# Patient Record
Sex: FEMALE | Race: WHITE | NOT HISPANIC OR LATINO | Employment: FULL TIME | URBAN - METROPOLITAN AREA
[De-identification: names, ages, dates, MRNs, and addresses within clinical notes are randomized per-mention and may not be internally consistent; named-entity substitution may affect disease eponyms.]

---

## 2023-02-18 PROBLEM — R20.2 PARESTHESIA OF BOTH FEET: Status: ACTIVE | Noted: 2023-02-18

## 2023-02-18 PROBLEM — M67.01 SHORT ACHILLES TENDON OF RIGHT LOWER EXTREMITY: Status: ACTIVE | Noted: 2023-02-18

## 2023-02-18 PROBLEM — E11.9 TYPE 2 DIABETES MELLITUS (MULTI): Status: ACTIVE | Noted: 2023-02-18

## 2023-02-18 PROBLEM — E66.01 SEVERE OBESITY (BMI >= 40) (MULTI): Status: ACTIVE | Noted: 2023-02-18

## 2023-02-18 PROBLEM — M21.6X1 PRONATION OF BOTH FEET: Status: ACTIVE | Noted: 2023-02-18

## 2023-02-18 PROBLEM — N91.5 OLIGOMENORRHEA: Status: ACTIVE | Noted: 2023-02-18

## 2023-02-18 PROBLEM — N97.9 FEMALE INFERTILITY: Status: ACTIVE | Noted: 2023-02-18

## 2023-02-18 PROBLEM — F41.9 ANXIETY: Status: ACTIVE | Noted: 2023-02-18

## 2023-02-18 PROBLEM — E11.9 DIABETES MELLITUS (MULTI): Status: ACTIVE | Noted: 2023-02-18

## 2023-02-18 PROBLEM — R63.2 POLYPHAGIA: Status: ACTIVE | Noted: 2023-02-18

## 2023-02-18 PROBLEM — M67.02 SHORT ACHILLES TENDON OF LEFT LOWER EXTREMITY: Status: ACTIVE | Noted: 2023-02-18

## 2023-02-18 PROBLEM — I10 HYPERTENSION: Status: ACTIVE | Noted: 2023-02-18

## 2023-02-18 PROBLEM — M21.6X2 PRONATION OF BOTH FEET: Status: ACTIVE | Noted: 2023-02-18

## 2023-02-18 PROBLEM — M72.2 PLANTAR FASCIITIS: Status: ACTIVE | Noted: 2023-02-18

## 2023-02-18 PROBLEM — J30.9 ALLERGIC RHINITIS: Status: ACTIVE | Noted: 2023-02-18

## 2023-02-18 PROBLEM — I10 CHRONIC HYPERTENSION: Status: ACTIVE | Noted: 2023-02-18

## 2023-02-18 PROBLEM — M67.00 ACQUIRED SHORT ACHILLES TENDON: Status: ACTIVE | Noted: 2023-02-18

## 2023-02-18 PROBLEM — G47.33 OBSTRUCTIVE SLEEP APNEA: Status: ACTIVE | Noted: 2023-02-18

## 2023-02-18 PROBLEM — E66.01 OBESITY, MORBID (MORE THAN 100 LBS OVER IDEAL WEIGHT OR BMI > 40) (MULTI): Status: ACTIVE | Noted: 2023-02-18

## 2023-02-18 RX ORDER — TIRZEPATIDE 5 MG/.5ML
5 INJECTION, SOLUTION SUBCUTANEOUS
COMMUNITY
End: 2023-03-07

## 2023-02-18 RX ORDER — METFORMIN HYDROCHLORIDE 500 MG/1
1 TABLET ORAL DAILY
COMMUNITY
Start: 2019-10-30 | End: 2023-03-07 | Stop reason: SDUPTHER

## 2023-02-18 RX ORDER — LANCETS 33 GAUGE
EACH MISCELLANEOUS
COMMUNITY
End: 2023-03-07

## 2023-02-18 RX ORDER — METFORMIN HYDROCHLORIDE 750 MG/1
2 TABLET, EXTENDED RELEASE ORAL
COMMUNITY
Start: 2019-10-30 | End: 2023-03-07 | Stop reason: SDUPTHER

## 2023-02-18 RX ORDER — LABETALOL 100 MG/1
1 TABLET, FILM COATED ORAL EVERY 12 HOURS
COMMUNITY
End: 2023-03-07 | Stop reason: SDUPTHER

## 2023-02-18 RX ORDER — BLOOD-GLUCOSE METER
EACH MISCELLANEOUS
COMMUNITY
End: 2023-03-07 | Stop reason: ALTCHOICE

## 2023-02-18 RX ORDER — AZELASTINE 1 MG/ML
2 SPRAY, METERED NASAL 2 TIMES DAILY
COMMUNITY
Start: 2022-07-22 | End: 2023-09-14 | Stop reason: WASHOUT

## 2023-03-06 NOTE — PROGRESS NOTES
Subjective   Arti Macias is a 32 y.o. female who presents for Hypertension, Diabetes, and Follow-up.  HPI    PMHx:  - Allergic rhinitis - on zyrtec, no response to floanse.   - DM2 with neuropathy A1C 8.4% diagnosed at 16, type 2. Takes metformin 750mg ER twice a day and metformin 500mg at night followed by endocrinology Vickie Phelps who started her on Mounjaro in November but was concerned about potential increase in risk for thyroid cancer (cousin with papillary thyroid cancer). She has hired a  and has lost inches in her weights. She has adjusted her diet regimen and has been trying to make better choices in general. She has found no significant benefit to nutrition referral.   - Planter fasciitis previously recommended PT. has tried stretching exercises   - Hypertension on labetolol improved at last visit interested in becoming pregnant  - Gallstones for the past 10 years. not an issue for her last flare over 20 years ago.  - JACKIE - chronic - previously on several medications discontinued in adulthood, provided [facility] resources at last visit.  - Sleep disorder referred to sleep medicine   - Lower extremity edema thought secondary to venous insufficiency   - Class III obesity - not interested in medical management, previously on phentermine which worked (but restriction in [place] scheduling), the first time she took it lost 30 pounds in 3 months, second time was not successful because did not do lifestyle modifications along with it. She did note heart racing on this medication and notices an energy boost on this medication. When originally lost the weight was 295 pounds and A1C reduced to 6.6%      Social:   - Lives at home with ,  at [facility]   - no children.     Objective     /84   Pulse 88   Temp 36.7 °C (98.1 °F)   Wt (!) 142 kg (314 lb)   BMI 52.25 kg/m²      Exam:   General: Alert and oriented, in no apparent distress   HEENT: No conjunctival erythema, no  external facial lesions   Lungs: Breathing comfortably   Skin: No evidence of skin breakdown   Neuro: AAO x 3, answering questions appropriately, no obvious cranial nerve deficits.    Problem List Items Addressed This Visit    Assessment and Plan      Nervous    Type 2 diabetes mellitus with diabetic neuropathy, without long-term current use of insulin (CMS/[facility])     Last A1C 8.4%, interested in rechecking with endocrinology, maintained on metformin 750mg x 2 + metformin 500mg at bedtime.   Started mounjaro by endocrinology though she is extremely hesitant regarding initiation of GLP-1 agonists due to concern regarding theoretical increase risk in thyroid cancer. There is no known diagnosis of MEN syndrome or medullary thyroid carcinoma in her family.   She does have known history of gallstones that she is not interested in managing surgically at present and this may be a reason to hold off on GLP-1 agonists.   Continues with lifestyle modifications, interested in further addressing with endocrinology.   UTD with optho, neuropathy stable, not yet on statin, screen for nephropathy.          Relevant Medications    metFORMIN XR (Glucophage-XR) 750 mg 24 hr tablet    metFORMIN (Glucophage) 500 mg tablet       Circulatory    Hypertension - Primary     Maintained on Labetolol, currently controlled, was interested in becoming pregnant but precontemplative regarding alternate treatment method.   Does not wish to restart an ACEi, discussed consideration for ARB to avoid the dry cough as a possible side effect.          Relevant Medications    labetalol (Normodyne) 100 mg tablet       Digestive    Gallstones     Triggered by certain foods, not interested in surgical management at present.            Endocrine/Metabolic    Severe obesity (BMI >= 40) (CMS/[facility])     Extensively discussed, not currently interested in GLP-1 agonist, though has had some success with phentermine even when prescribed on a limited basis.   Discussed  concern regarding risk for weight gain after period of discontinuation and its potential long term consequences including evidence of reduced basal metabolic rate which does not recover if weight were to be regained. For now, elected to pursue continued lifestyle interventions and hold off on any medical management of her weight at present.   Has seen nutritionist but not interested in following up further, follows with .   Further encouragement and counseling provided, will continue to monitor.                Time Spent  Prep time on day of patient encounter: 5 minutes

## 2023-03-07 ENCOUNTER — OFFICE VISIT (OUTPATIENT)
Dept: PRIMARY CARE | Facility: CLINIC | Age: 33
End: 2023-03-07
Payer: COMMERCIAL

## 2023-03-07 VITALS
DIASTOLIC BLOOD PRESSURE: 84 MMHG | SYSTOLIC BLOOD PRESSURE: 136 MMHG | HEART RATE: 88 BPM | BODY MASS INDEX: 52.25 KG/M2 | TEMPERATURE: 98.1 F | WEIGHT: 293 LBS

## 2023-03-07 DIAGNOSIS — I10 PRIMARY HYPERTENSION: Primary | ICD-10-CM

## 2023-03-07 DIAGNOSIS — E11.40 TYPE 2 DIABETES MELLITUS WITH DIABETIC NEUROPATHY, WITHOUT LONG-TERM CURRENT USE OF INSULIN (MULTI): Chronic | ICD-10-CM

## 2023-03-07 DIAGNOSIS — Z00.00 ENCOUNTER FOR PREVENTATIVE ADULT HEALTH CARE EXAMINATION: ICD-10-CM

## 2023-03-07 DIAGNOSIS — K80.20 GALLSTONES: ICD-10-CM

## 2023-03-07 DIAGNOSIS — E66.01 SEVERE OBESITY (BMI >= 40) (MULTI): ICD-10-CM

## 2023-03-07 PROBLEM — E11.9 DIABETES MELLITUS (MULTI): Status: RESOLVED | Noted: 2023-02-18 | Resolved: 2023-03-07

## 2023-03-07 PROBLEM — M67.02 SHORT ACHILLES TENDON OF LEFT LOWER EXTREMITY: Status: RESOLVED | Noted: 2023-02-18 | Resolved: 2023-03-07

## 2023-03-07 PROBLEM — E11.9 TYPE 2 DIABETES MELLITUS (MULTI): Status: RESOLVED | Noted: 2023-02-18 | Resolved: 2023-03-07

## 2023-03-07 PROBLEM — M72.2 PLANTAR FASCIITIS: Status: RESOLVED | Noted: 2023-02-18 | Resolved: 2023-03-07

## 2023-03-07 PROBLEM — M67.01 SHORT ACHILLES TENDON OF RIGHT LOWER EXTREMITY: Status: RESOLVED | Noted: 2023-02-18 | Resolved: 2023-03-07

## 2023-03-07 PROCEDURE — 3079F DIAST BP 80-89 MM HG: CPT | Performed by: INTERNAL MEDICINE

## 2023-03-07 PROCEDURE — 99213 OFFICE O/P EST LOW 20 MIN: CPT | Performed by: INTERNAL MEDICINE

## 2023-03-07 PROCEDURE — 3075F SYST BP GE 130 - 139MM HG: CPT | Performed by: INTERNAL MEDICINE

## 2023-03-07 PROCEDURE — 1036F TOBACCO NON-USER: CPT | Performed by: INTERNAL MEDICINE

## 2023-03-07 RX ORDER — METFORMIN HYDROCHLORIDE 750 MG/1
1500 TABLET, EXTENDED RELEASE ORAL
Qty: 180 TABLET | Refills: 1 | Status: SHIPPED | OUTPATIENT
Start: 2023-03-07 | End: 2023-09-14 | Stop reason: WASHOUT

## 2023-03-07 RX ORDER — LABETALOL 100 MG/1
1 TABLET, FILM COATED ORAL EVERY 12 HOURS
Qty: 180 TABLET | Refills: 1 | Status: SHIPPED | OUTPATIENT
Start: 2023-03-07 | End: 2023-12-11 | Stop reason: SDUPTHER

## 2023-03-07 RX ORDER — METFORMIN HYDROCHLORIDE 500 MG/1
500 TABLET ORAL
Qty: 90 TABLET | Refills: 1 | Status: SHIPPED | OUTPATIENT
Start: 2023-03-07 | End: 2023-09-14 | Stop reason: WASHOUT

## 2023-03-07 NOTE — PATIENT INSTRUCTIONS
Let's schedule a physical exam in 6 months   Measure home blood pressure readings intermittently. Goal should be 120s/70s.   Diabetes and weight - consider Mounjaro as discussed, we can also further discuss phentermine.

## 2023-03-08 NOTE — ASSESSMENT & PLAN NOTE
Last A1C 8.4%, interested in rechecking with endocrinology, maintained on metformin 750mg x 2 + metformin 500mg at bedtime.   Started mounjaro by endocrinology though she is extremely hesitant regarding initiation of GLP-1 agonists due to concern regarding theoretical increase risk in thyroid cancer. There is no known diagnosis of MEN syndrome or medullary thyroid carcinoma in her family.   She does have known history of gallstones that she is not interested in managing surgically at present and this may be a reason to hold off on GLP-1 agonists.   Continues with lifestyle modifications, interested in further addressing with endocrinology.   UTD with optho, neuropathy stable, not yet on statin, screen for nephropathy.

## 2023-03-08 NOTE — ASSESSMENT & PLAN NOTE
Extensively discussed, not currently interested in GLP-1 agonist, though has had some success with phentermine even when prescribed on a limited basis.   Discussed concern regarding risk for weight gain after period of discontinuation and its potential long term consequences including evidence of reduced basal metabolic rate which does not recover if weight were to be regained. For now, elected to pursue continued lifestyle interventions and hold off on any medical management of her weight at present.   Has seen nutritionist but not interested in following up further, follows with .   Further encouragement and counseling provided, will continue to monitor.

## 2023-03-08 NOTE — ASSESSMENT & PLAN NOTE
Maintained on Labetolol, currently controlled, was interested in becoming pregnant but precontemplative regarding alternate treatment method.   Does not wish to restart an ACEi, discussed consideration for ARB to avoid the dry cough as a possible side effect.

## 2023-04-03 ENCOUNTER — APPOINTMENT (OUTPATIENT)
Dept: PRIMARY CARE | Facility: CLINIC | Age: 33
End: 2023-04-03
Payer: COMMERCIAL

## 2023-04-04 ENCOUNTER — TELEMEDICINE (OUTPATIENT)
Dept: PRIMARY CARE | Facility: CLINIC | Age: 33
End: 2023-04-04
Payer: COMMERCIAL

## 2023-04-04 DIAGNOSIS — E66.01 OBESITY, MORBID (MORE THAN 100 LBS OVER IDEAL WEIGHT OR BMI > 40) (MULTI): ICD-10-CM

## 2023-04-04 DIAGNOSIS — R10.9 LEFT FLANK PAIN: Primary | ICD-10-CM

## 2023-04-04 PROCEDURE — 99214 OFFICE O/P EST MOD 30 MIN: CPT | Performed by: INTERNAL MEDICINE

## 2023-04-04 NOTE — ASSESSMENT & PLAN NOTE
Patient presenting for left flank and side pain with worsening during exercise, relieved after prolonged period of rest.  There are no alarm features symptoms are reproducible to palpation, no urinary or GI complaints.  Differential includes musculoskeletal versus nephrolithiasis versus anterior cutaneous nerve entrapment syndrome.  For now, advised trial of stretching hydration heating pad and conservative pain control.  If symptoms fail to improve will obtain urine studies to assess for hematuria and consideration for Hemoccult CT to rule out nephrolithiasis.  Warning signs reviewed.

## 2023-04-04 NOTE — ASSESSMENT & PLAN NOTE
With good success after initiation half dose of phentermine with only mild side effects. Has adequate followup with Vickie Phelps.

## 2023-04-04 NOTE — PROGRESS NOTES
Subjective      HPI  32-year-old female here out of concern for back pain, last seen last month.     She has been experiencing left sided dull back pain that has been dull, now worse when working out. It was previously described as a cramping sensation worsens with exercise but improves when she stops working out for days. Her workout schedule has been a little off. This has never happened before in this region. No issues with urination, no GI symptoms. The symptoms come and and go, has good and bad days, describes as soreness, uncomfortable to sit for long periods of time, reproducible on palpation. She does sleep on that side as well. The pain does not radiate to the groin. Overall she is feeling good and is eating well. She admits to not drinking a lot of water, and has been urinating less since starting, though she did cut out coffee.     PMHx:  - DM2 with neuropathy A1C 8.4% diagnosed at 16, type 2. Takes metformin 750mg ER twice a day and metformin 500mg at night followed by endocrinology Vickie Phelps who started her on Phentermine recently (concerned regarding issues with mounjaro). She has hired a  and has lost inches in her weights. She has adjusted her diet regimen and has been trying to make better choices in general. She has found no significant benefit to nutrition referral. She has lost 30 pounds in 3 months when last on phentermine. Since starting phentermine, she has thus far lost over 11 pounds.  - Hypertension on labetolol improved at last visit interested in becoming pregnant discussed ARB at last visit   - Gallstones for the past 10 years. not an issue for her last flare over 20 years ago.  - JACKIE - chronic - previously on several medications discontinued in adulthood, provided [facility] resources at last visit.  - Sleep disorder referred to sleep medicine   - Lower extremity edema thought secondary to venous insufficiency   Social:   - Lives at home with ,  at  SNF   - no children.     Review of Systems  Review of systems as stated above, otherwise unremarkable.       Objective   Physical Activity: Not on file   General: Alert and oriented, in no apparent distress   HEENT: No conjunctival erythema, no external facial lesions   Lungs: Breathing comfortably  Skin: No evidence of skin breakdown.  Neuro: AAO x 3, answering questions appropriately, no obvious cranial nerve deficits  She is able to point to her midabdomen in the midaxillary line with reproducible tenderness to palpation, no apparent rebound with palpation no additional alarm symptoms.       Assessment/Plan   Problem List Items Addressed This Visit          Nervous    Left flank pain - Primary    Current Assessment & Plan     Patient presenting for left flank and side pain with worsening during exercise, relieved after prolonged period of rest.  There are no alarm features symptoms are reproducible to palpation, no urinary or GI complaints.  Differential includes musculoskeletal versus nephrolithiasis versus anterior cutaneous nerve entrapment syndrome.  For now, advised trial of stretching hydration heating pad and conservative pain control.  If symptoms fail to improve will obtain urine studies to assess for hematuria and consideration for Hemoccult CT to rule out nephrolithiasis.  Warning signs reviewed.

## 2023-04-13 ENCOUNTER — LAB (OUTPATIENT)
Dept: LAB | Facility: LAB | Age: 33
End: 2023-04-13
Payer: COMMERCIAL

## 2023-04-13 DIAGNOSIS — R10.9 LEFT FLANK PAIN: ICD-10-CM

## 2023-04-13 LAB
ALANINE AMINOTRANSFERASE (SGPT) (U/L) IN SER/PLAS: 18 U/L (ref 7–45)
ALBUMIN (G/DL) IN SER/PLAS: 4 G/DL (ref 3.4–5)
ALBUMIN (MG/L) IN URINE: 17.6 MG/L
ALBUMIN/CREATININE (UG/MG) IN URINE: 13 UG/MG CRT (ref 0–30)
ALKALINE PHOSPHATASE (U/L) IN SER/PLAS: 35 U/L (ref 33–110)
ANION GAP IN SER/PLAS: 16 MMOL/L (ref 10–20)
APPEARANCE, URINE: ABNORMAL
ASPARTATE AMINOTRANSFERASE (SGOT) (U/L) IN SER/PLAS: 17 U/L (ref 9–39)
BASOPHILS (10*3/UL) IN BLOOD BY AUTOMATED COUNT: 0.05 X10E9/L (ref 0–0.1)
BASOPHILS/100 LEUKOCYTES IN BLOOD BY AUTOMATED COUNT: 0.7 % (ref 0–2)
BILIRUBIN TOTAL (MG/DL) IN SER/PLAS: 0.4 MG/DL (ref 0–1.2)
BILIRUBIN, URINE: NEGATIVE
BLOOD, URINE: ABNORMAL
CALCIUM (MG/DL) IN SER/PLAS: 9.5 MG/DL (ref 8.6–10.6)
CARBON DIOXIDE, TOTAL (MMOL/L) IN SER/PLAS: 25 MMOL/L (ref 21–32)
CHLORIDE (MMOL/L) IN SER/PLAS: 104 MMOL/L (ref 98–107)
COLOR, URINE: YELLOW
CREATININE (MG/DL) IN SER/PLAS: 0.75 MG/DL (ref 0.5–1.05)
CREATININE (MG/DL) IN URINE: 135 MG/DL (ref 20–320)
EOSINOPHILS (10*3/UL) IN BLOOD BY AUTOMATED COUNT: 0.11 X10E9/L (ref 0–0.7)
EOSINOPHILS/100 LEUKOCYTES IN BLOOD BY AUTOMATED COUNT: 1.4 % (ref 0–6)
ERYTHROCYTE DISTRIBUTION WIDTH (RATIO) BY AUTOMATED COUNT: 14.4 % (ref 11.5–14.5)
ERYTHROCYTE MEAN CORPUSCULAR HEMOGLOBIN CONCENTRATION (G/DL) BY AUTOMATED: 30.2 G/DL (ref 32–36)
ERYTHROCYTE MEAN CORPUSCULAR VOLUME (FL) BY AUTOMATED COUNT: 79 FL (ref 80–100)
ERYTHROCYTES (10*6/UL) IN BLOOD BY AUTOMATED COUNT: 5 X10E12/L (ref 4–5.2)
GFR FEMALE: >90 ML/MIN/1.73M2
GLUCOSE (MG/DL) IN SER/PLAS: 132 MG/DL (ref 74–99)
GLUCOSE, URINE: NEGATIVE MG/DL
HEMATOCRIT (%) IN BLOOD BY AUTOMATED COUNT: 39.4 % (ref 36–46)
HEMOGLOBIN (G/DL) IN BLOOD: 11.9 G/DL (ref 12–16)
IMMATURE GRANULOCYTES/100 LEUKOCYTES IN BLOOD BY AUTOMATED COUNT: 0.3 % (ref 0–0.9)
KETONES, URINE: ABNORMAL MG/DL
LEUKOCYTE ESTERASE, URINE: ABNORMAL
LEUKOCYTES (10*3/UL) IN BLOOD BY AUTOMATED COUNT: 7.7 X10E9/L (ref 4.4–11.3)
LYMPHOCYTES (10*3/UL) IN BLOOD BY AUTOMATED COUNT: 2.32 X10E9/L (ref 1.2–4.8)
LYMPHOCYTES/100 LEUKOCYTES IN BLOOD BY AUTOMATED COUNT: 30.3 % (ref 13–44)
MONOCYTES (10*3/UL) IN BLOOD BY AUTOMATED COUNT: 0.57 X10E9/L (ref 0.1–1)
MONOCYTES/100 LEUKOCYTES IN BLOOD BY AUTOMATED COUNT: 7.4 % (ref 2–10)
NEUTROPHILS (10*3/UL) IN BLOOD BY AUTOMATED COUNT: 4.59 X10E9/L (ref 1.2–7.7)
NEUTROPHILS/100 LEUKOCYTES IN BLOOD BY AUTOMATED COUNT: 59.9 % (ref 40–80)
NITRITE, URINE: NEGATIVE
NRBC (PER 100 WBCS) BY AUTOMATED COUNT: 0 /100 WBC (ref 0–0)
PH, URINE: 5 (ref 5–8)
PLATELETS (10*3/UL) IN BLOOD AUTOMATED COUNT: 326 X10E9/L (ref 150–450)
POTASSIUM (MMOL/L) IN SER/PLAS: 4.5 MMOL/L (ref 3.5–5.3)
PROTEIN TOTAL: 6.9 G/DL (ref 6.4–8.2)
PROTEIN, URINE: NEGATIVE MG/DL
RBC, URINE: 1 /HPF (ref 0–5)
SODIUM (MMOL/L) IN SER/PLAS: 140 MMOL/L (ref 136–145)
SPECIFIC GRAVITY, URINE: 1.02 (ref 1–1.03)
SQUAMOUS EPITHELIAL CELLS, URINE: 4 /HPF
UREA NITROGEN (MG/DL) IN SER/PLAS: 10 MG/DL (ref 6–23)
UROBILINOGEN, URINE: <2 MG/DL (ref 0–1.9)
WBC, URINE: 9 /HPF (ref 0–5)

## 2023-04-13 PROCEDURE — 85025 COMPLETE CBC W/AUTO DIFF WBC: CPT

## 2023-04-13 PROCEDURE — 82043 UR ALBUMIN QUANTITATIVE: CPT

## 2023-04-13 PROCEDURE — 80053 COMPREHEN METABOLIC PANEL: CPT

## 2023-04-13 PROCEDURE — 81001 URINALYSIS AUTO W/SCOPE: CPT

## 2023-04-13 PROCEDURE — 36415 COLL VENOUS BLD VENIPUNCTURE: CPT

## 2023-04-13 PROCEDURE — 82570 ASSAY OF URINE CREATININE: CPT

## 2023-04-15 DIAGNOSIS — D64.9 ANEMIA, UNSPECIFIED TYPE: Primary | ICD-10-CM

## 2023-06-28 ENCOUNTER — LAB (OUTPATIENT)
Dept: LAB | Facility: LAB | Age: 33
End: 2023-06-28
Payer: COMMERCIAL

## 2023-06-28 DIAGNOSIS — D64.9 ANEMIA, UNSPECIFIED TYPE: ICD-10-CM

## 2023-06-28 LAB
ALANINE AMINOTRANSFERASE (SGPT) (U/L) IN SER/PLAS: 10 U/L (ref 7–45)
ALBUMIN (G/DL) IN SER/PLAS: 4 G/DL (ref 3.4–5)
ALBUMIN (MG/L) IN URINE: 15 MG/L
ALBUMIN/CREATININE (UG/MG) IN URINE: 6.4 UG/MG CRT (ref 0–30)
ALKALINE PHOSPHATASE (U/L) IN SER/PLAS: 37 U/L (ref 33–110)
ANION GAP IN SER/PLAS: 16 MMOL/L (ref 10–20)
ASPARTATE AMINOTRANSFERASE (SGOT) (U/L) IN SER/PLAS: 10 U/L (ref 9–39)
BASOPHILS (10*3/UL) IN BLOOD BY AUTOMATED COUNT: 0.05 X10E9/L (ref 0–0.1)
BASOPHILS/100 LEUKOCYTES IN BLOOD BY AUTOMATED COUNT: 0.5 % (ref 0–2)
BILIRUBIN TOTAL (MG/DL) IN SER/PLAS: 0.4 MG/DL (ref 0–1.2)
CALCIUM (MG/DL) IN SER/PLAS: 9.6 MG/DL (ref 8.6–10.6)
CARBON DIOXIDE, TOTAL (MMOL/L) IN SER/PLAS: 23 MMOL/L (ref 21–32)
CHLORIDE (MMOL/L) IN SER/PLAS: 103 MMOL/L (ref 98–107)
CHOLESTEROL (MG/DL) IN SER/PLAS: 195 MG/DL (ref 0–199)
CHOLESTEROL IN HDL (MG/DL) IN SER/PLAS: 36.6 MG/DL
CHOLESTEROL/HDL RATIO: 5.3
CREATININE (MG/DL) IN SER/PLAS: 0.78 MG/DL (ref 0.5–1.05)
CREATININE (MG/DL) IN URINE: 233 MG/DL (ref 20–320)
EOSINOPHILS (10*3/UL) IN BLOOD BY AUTOMATED COUNT: 0.12 X10E9/L (ref 0–0.7)
EOSINOPHILS/100 LEUKOCYTES IN BLOOD BY AUTOMATED COUNT: 1.2 % (ref 0–6)
ERYTHROCYTE DISTRIBUTION WIDTH (RATIO) BY AUTOMATED COUNT: 14.3 % (ref 11.5–14.5)
ERYTHROCYTE MEAN CORPUSCULAR HEMOGLOBIN CONCENTRATION (G/DL) BY AUTOMATED: 30.1 G/DL (ref 32–36)
ERYTHROCYTE MEAN CORPUSCULAR VOLUME (FL) BY AUTOMATED COUNT: 81 FL (ref 80–100)
ERYTHROCYTES (10*6/UL) IN BLOOD BY AUTOMATED COUNT: 4.97 X10E12/L (ref 4–5.2)
ESTIMATED AVERAGE GLUCOSE FOR HBA1C: 171 MG/DL
FERRITIN (UG/LL) IN SER/PLAS: 45 UG/L (ref 8–150)
GFR FEMALE: >90 ML/MIN/1.73M2
GLUCOSE (MG/DL) IN SER/PLAS: 197 MG/DL (ref 74–99)
HEMATOCRIT (%) IN BLOOD BY AUTOMATED COUNT: 40.2 % (ref 36–46)
HEMOGLOBIN (G/DL) IN BLOOD: 12.1 G/DL (ref 12–16)
HEMOGLOBIN A1C/HEMOGLOBIN TOTAL IN BLOOD: 7.6 %
IMMATURE GRANULOCYTES/100 LEUKOCYTES IN BLOOD BY AUTOMATED COUNT: 0.3 % (ref 0–0.9)
IRON (UG/DL) IN SER/PLAS: 33 UG/DL (ref 35–150)
IRON BINDING CAPACITY (UG/DL) IN SER/PLAS: 353 UG/DL (ref 240–445)
IRON SATURATION (%) IN SER/PLAS: 9 % (ref 25–45)
LDL: 109 MG/DL (ref 0–99)
LEUKOCYTES (10*3/UL) IN BLOOD BY AUTOMATED COUNT: 9.7 X10E9/L (ref 4.4–11.3)
LYMPHOCYTES (10*3/UL) IN BLOOD BY AUTOMATED COUNT: 1.86 X10E9/L (ref 1.2–4.8)
LYMPHOCYTES/100 LEUKOCYTES IN BLOOD BY AUTOMATED COUNT: 19.2 % (ref 13–44)
MONOCYTES (10*3/UL) IN BLOOD BY AUTOMATED COUNT: 0.57 X10E9/L (ref 0.1–1)
MONOCYTES/100 LEUKOCYTES IN BLOOD BY AUTOMATED COUNT: 5.9 % (ref 2–10)
NEUTROPHILS (10*3/UL) IN BLOOD BY AUTOMATED COUNT: 7.08 X10E9/L (ref 1.2–7.7)
NEUTROPHILS/100 LEUKOCYTES IN BLOOD BY AUTOMATED COUNT: 72.9 % (ref 40–80)
NON HDL CHOLESTEROL: 158 MG/DL
NRBC (PER 100 WBCS) BY AUTOMATED COUNT: 0 /100 WBC (ref 0–0)
PLATELETS (10*3/UL) IN BLOOD AUTOMATED COUNT: 347 X10E9/L (ref 150–450)
POTASSIUM (MMOL/L) IN SER/PLAS: 4.4 MMOL/L (ref 3.5–5.3)
PROTEIN TOTAL: 7 G/DL (ref 6.4–8.2)
SODIUM (MMOL/L) IN SER/PLAS: 138 MMOL/L (ref 136–145)
THYROTROPIN (MIU/L) IN SER/PLAS BY DETECTION LIMIT <= 0.05 MIU/L: 1.61 MIU/L (ref 0.44–3.98)
TRIGLYCERIDE (MG/DL) IN SER/PLAS: 246 MG/DL (ref 0–149)
UREA NITROGEN (MG/DL) IN SER/PLAS: 10 MG/DL (ref 6–23)
VLDL: 49 MG/DL (ref 0–40)

## 2023-06-28 PROCEDURE — 82728 ASSAY OF FERRITIN: CPT

## 2023-06-28 PROCEDURE — 83550 IRON BINDING TEST: CPT

## 2023-06-28 PROCEDURE — 83540 ASSAY OF IRON: CPT

## 2023-06-28 PROCEDURE — 36415 COLL VENOUS BLD VENIPUNCTURE: CPT

## 2023-06-28 PROCEDURE — 85025 COMPLETE CBC W/AUTO DIFF WBC: CPT

## 2023-06-29 DIAGNOSIS — E61.1 IRON DEFICIENCY: Primary | ICD-10-CM

## 2023-07-04 DIAGNOSIS — R10.9 LEFT FLANK PAIN: Primary | ICD-10-CM

## 2023-08-02 DIAGNOSIS — R10.9 LEFT FLANK PAIN: Primary | ICD-10-CM

## 2023-08-23 PROBLEM — N92.6 IRREGULAR MENSES: Status: ACTIVE | Noted: 2023-08-23

## 2023-08-23 PROBLEM — E11.65 TYPE 2 DIABETES MELLITUS WITH HYPERGLYCEMIA (MULTI): Status: ACTIVE | Noted: 2023-08-23

## 2023-08-23 PROBLEM — K76.0 FATTY LIVER: Status: ACTIVE | Noted: 2020-06-15

## 2023-08-23 PROBLEM — E78.2 MIXED HYPERLIPIDEMIA: Status: ACTIVE | Noted: 2023-08-23

## 2023-08-23 RX ORDER — BLOOD-GLUCOSE METER
EACH MISCELLANEOUS
COMMUNITY
Start: 2022-11-18 | End: 2023-09-14 | Stop reason: WASHOUT

## 2023-08-23 RX ORDER — NORETHINDRONE 0.35 MG/1
TABLET ORAL
COMMUNITY
Start: 2014-09-17 | End: 2023-09-14 | Stop reason: WASHOUT

## 2023-08-23 RX ORDER — PHENTERMINE HYDROCHLORIDE 37.5 MG/1
1 TABLET ORAL DAILY
COMMUNITY
Start: 2023-03-16 | End: 2023-10-03 | Stop reason: SDUPTHER

## 2023-08-23 RX ORDER — METFORMIN HYDROCHLORIDE 1000 MG/1
1000 TABLET ORAL
COMMUNITY
Start: 2022-06-10 | End: 2024-06-03 | Stop reason: ALTCHOICE

## 2023-08-23 RX ORDER — OMEPRAZOLE 20 MG/1
20 CAPSULE, DELAYED RELEASE ORAL DAILY
COMMUNITY
Start: 2011-09-19 | End: 2023-09-14 | Stop reason: WASHOUT

## 2023-08-23 RX ORDER — ALBUTEROL SULFATE 90 UG/1
2 AEROSOL, METERED RESPIRATORY (INHALATION) EVERY 4 HOURS PRN
COMMUNITY
Start: 2022-03-29 | End: 2023-09-14 | Stop reason: WASHOUT

## 2023-09-08 ASSESSMENT — PROMIS GLOBAL HEALTH SCALE
RATE_PHYSICAL_HEALTH: GOOD
RATE_QUALITY_OF_LIFE: VERY GOOD
CARRYOUT_SOCIAL_ACTIVITIES: GOOD
EMOTIONAL_PROBLEMS: SOMETIMES
RATE_MENTAL_HEALTH: GOOD
RATE_AVERAGE_PAIN: 2
RATE_SOCIAL_SATISFACTION: GOOD
CARRYOUT_PHYSICAL_ACTIVITIES: COMPLETELY
RATE_GENERAL_HEALTH: GOOD

## 2023-09-14 ENCOUNTER — OFFICE VISIT (OUTPATIENT)
Dept: PRIMARY CARE | Facility: CLINIC | Age: 33
End: 2023-09-14
Payer: COMMERCIAL

## 2023-09-14 VITALS
HEART RATE: 95 BPM | SYSTOLIC BLOOD PRESSURE: 116 MMHG | WEIGHT: 292.5 LBS | DIASTOLIC BLOOD PRESSURE: 72 MMHG | TEMPERATURE: 97.7 F | BODY MASS INDEX: 48.67 KG/M2

## 2023-09-14 DIAGNOSIS — K80.20 GALLSTONES: ICD-10-CM

## 2023-09-14 DIAGNOSIS — Z00.00 ENCOUNTER FOR PREVENTATIVE ADULT HEALTH CARE EXAMINATION: ICD-10-CM

## 2023-09-14 DIAGNOSIS — F41.1 GAD (GENERALIZED ANXIETY DISORDER): Primary | ICD-10-CM

## 2023-09-14 DIAGNOSIS — E66.01 OBESITY, MORBID (MORE THAN 100 LBS OVER IDEAL WEIGHT OR BMI > 40) (MULTI): ICD-10-CM

## 2023-09-14 DIAGNOSIS — E11.40 TYPE 2 DIABETES MELLITUS WITH DIABETIC NEUROPATHY, WITHOUT LONG-TERM CURRENT USE OF INSULIN (MULTI): ICD-10-CM

## 2023-09-14 DIAGNOSIS — E61.1 IRON DEFICIENCY: ICD-10-CM

## 2023-09-14 DIAGNOSIS — I10 HYPERTENSION, UNSPECIFIED TYPE: ICD-10-CM

## 2023-09-14 DIAGNOSIS — E66.01 SEVERE OBESITY (BMI >= 40) (MULTI): ICD-10-CM

## 2023-09-14 PROCEDURE — 3074F SYST BP LT 130 MM HG: CPT | Performed by: INTERNAL MEDICINE

## 2023-09-14 PROCEDURE — 99395 PREV VISIT EST AGE 18-39: CPT | Performed by: INTERNAL MEDICINE

## 2023-09-14 PROCEDURE — 3078F DIAST BP <80 MM HG: CPT | Performed by: INTERNAL MEDICINE

## 2023-09-14 PROCEDURE — 3051F HG A1C>EQUAL 7.0%<8.0%: CPT | Performed by: INTERNAL MEDICINE

## 2023-09-14 PROCEDURE — 1036F TOBACCO NON-USER: CPT | Performed by: INTERNAL MEDICINE

## 2023-09-14 ASSESSMENT — PATIENT HEALTH QUESTIONNAIRE - PHQ9
1. LITTLE INTEREST OR PLEASURE IN DOING THINGS: NOT AT ALL
SUM OF ALL RESPONSES TO PHQ9 QUESTIONS 1 & 2: 0
2. FEELING DOWN, DEPRESSED OR HOPELESS: NOT AT ALL

## 2023-09-14 ASSESSMENT — ANXIETY QUESTIONNAIRES
GAD7 TOTAL SCORE: 21
6. BECOMING EASILY ANNOYED OR IRRITABLE: NEARLY EVERY DAY
IF YOU CHECKED OFF ANY PROBLEMS ON THIS QUESTIONNAIRE, HOW DIFFICULT HAVE THESE PROBLEMS MADE IT FOR YOU TO DO YOUR WORK, TAKE CARE OF THINGS AT HOME, OR GET ALONG WITH OTHER PEOPLE: SOMEWHAT DIFFICULT
2. NOT BEING ABLE TO STOP OR CONTROL WORRYING: NEARLY EVERY DAY
3. WORRYING TOO MUCH ABOUT DIFFERENT THINGS: NEARLY EVERY DAY
1. FEELING NERVOUS, ANXIOUS, OR ON EDGE: NEARLY EVERY DAY
5. BEING SO RESTLESS THAT IT IS HARD TO SIT STILL: NEARLY EVERY DAY
7. FEELING AFRAID AS IF SOMETHING AWFUL MIGHT HAPPEN: NEARLY EVERY DAY
4. TROUBLE RELAXING: NEARLY EVERY DAY

## 2023-09-14 NOTE — PATIENT INSTRUCTIONS
It was a pleasure to see you today! Here is a list of things we have discussed and to follow up on:    Referral to our behavioral health coordinator named Juan J - he should be reaching out to you.  Labs are ordered at the end of the month   For sleep - try melatonin 1-3mg + magnesium 400mg before bed.     Followup 6 months

## 2023-09-18 PROBLEM — E66.01 OBESITY, MORBID (MORE THAN 100 LBS OVER IDEAL WEIGHT OR BMI > 40) (MULTI): Status: RESOLVED | Noted: 2023-02-18 | Resolved: 2023-09-18

## 2023-09-18 PROBLEM — E11.65 TYPE 2 DIABETES MELLITUS WITH HYPERGLYCEMIA (MULTI): Status: RESOLVED | Noted: 2023-08-23 | Resolved: 2023-09-18

## 2023-09-18 NOTE — ASSESSMENT & PLAN NOTE
Continuing phentermine, no significant weight loss since last being seen.  Continue lifestyle modifications

## 2023-09-18 NOTE — ASSESSMENT & PLAN NOTE
Followed by Vickie Blevins on metformin, phentermine for weight reduction, last A1c 7.6% no known complications besides for neuropathy

## 2023-10-02 ENCOUNTER — LAB (OUTPATIENT)
Dept: LAB | Facility: LAB | Age: 33
End: 2023-10-02
Payer: COMMERCIAL

## 2023-10-02 DIAGNOSIS — E61.1 IRON DEFICIENCY: ICD-10-CM

## 2023-10-02 DIAGNOSIS — R10.9 LEFT FLANK PAIN: ICD-10-CM

## 2023-10-02 DIAGNOSIS — E11.40 TYPE 2 DIABETES MELLITUS WITH DIABETIC NEUROPATHY, WITHOUT LONG-TERM CURRENT USE OF INSULIN (MULTI): ICD-10-CM

## 2023-10-02 LAB
APPEARANCE UR: ABNORMAL
BILIRUB UR STRIP.AUTO-MCNC: NEGATIVE MG/DL
COLOR UR: YELLOW
EST. AVERAGE GLUCOSE BLD GHB EST-MCNC: 166 MG/DL
FERRITIN SERPL-MCNC: 44 NG/ML (ref 8–150)
GLUCOSE UR STRIP.AUTO-MCNC: NEGATIVE MG/DL
HBA1C MFR BLD: 7.4 %
IRON SATN MFR SERPL: 10 % (ref 25–45)
IRON SERPL-MCNC: 36 UG/DL (ref 35–150)
KETONES UR STRIP.AUTO-MCNC: NEGATIVE MG/DL
LEUKOCYTE ESTERASE UR QL STRIP.AUTO: ABNORMAL
MUCOUS THREADS #/AREA URNS AUTO: NORMAL /LPF
NITRITE UR QL STRIP.AUTO: NEGATIVE
PH UR STRIP.AUTO: 5 [PH]
PROT UR STRIP.AUTO-MCNC: NEGATIVE MG/DL
RBC # UR STRIP.AUTO: NEGATIVE /UL
RBC #/AREA URNS AUTO: NORMAL /HPF
SP GR UR STRIP.AUTO: 1.02
SQUAMOUS #/AREA URNS AUTO: NORMAL /HPF
TIBC SERPL-MCNC: 371 UG/DL (ref 240–445)
UIBC SERPL-MCNC: 335 UG/DL (ref 110–370)
UROBILINOGEN UR STRIP.AUTO-MCNC: <2 MG/DL
WBC #/AREA URNS AUTO: NORMAL /HPF

## 2023-10-02 PROCEDURE — 81001 URINALYSIS AUTO W/SCOPE: CPT

## 2023-10-02 PROCEDURE — 36415 COLL VENOUS BLD VENIPUNCTURE: CPT

## 2023-10-03 ENCOUNTER — OFFICE VISIT (OUTPATIENT)
Dept: ENDOCRINOLOGY | Facility: CLINIC | Age: 33
End: 2023-10-03
Payer: COMMERCIAL

## 2023-10-03 VITALS
TEMPERATURE: 98 F | WEIGHT: 291 LBS | HEART RATE: 83 BPM | BODY MASS INDEX: 47.69 KG/M2 | SYSTOLIC BLOOD PRESSURE: 134 MMHG | DIASTOLIC BLOOD PRESSURE: 82 MMHG

## 2023-10-03 DIAGNOSIS — I10 HYPERTENSION, UNSPECIFIED TYPE: ICD-10-CM

## 2023-10-03 DIAGNOSIS — E66.01 OBESITY, CLASS III, BMI 40-49.9 (MORBID OBESITY) (MULTI): Primary | ICD-10-CM

## 2023-10-03 DIAGNOSIS — E11.65 TYPE 2 DIABETES MELLITUS WITH HYPERGLYCEMIA, WITHOUT LONG-TERM CURRENT USE OF INSULIN (MULTI): ICD-10-CM

## 2023-10-03 PROCEDURE — 3051F HG A1C>EQUAL 7.0%<8.0%: CPT | Performed by: NURSE PRACTITIONER

## 2023-10-03 PROCEDURE — 1036F TOBACCO NON-USER: CPT | Performed by: NURSE PRACTITIONER

## 2023-10-03 PROCEDURE — 3079F DIAST BP 80-89 MM HG: CPT | Performed by: NURSE PRACTITIONER

## 2023-10-03 PROCEDURE — 3075F SYST BP GE 130 - 139MM HG: CPT | Performed by: NURSE PRACTITIONER

## 2023-10-03 PROCEDURE — 99214 OFFICE O/P EST MOD 30 MIN: CPT | Performed by: NURSE PRACTITIONER

## 2023-10-03 RX ORDER — PHENTERMINE HYDROCHLORIDE 37.5 MG/1
37.5 TABLET ORAL DAILY
Qty: 28 TABLET | Refills: 2 | Status: SHIPPED | OUTPATIENT
Start: 2023-10-03 | End: 2023-12-11 | Stop reason: SDUPTHER

## 2023-10-03 NOTE — Clinical Note
I cannot get her to start GLP1.  I was hoping she would be open to adding Rybelsus even to get her to goal with A1c and weight for reproductive endo in Feb.  Fingers crossed.  Overall she has improved greatly but this would get her to goal.  She will let me know.   Vickie

## 2023-10-03 NOTE — PATIENT INSTRUCTIONS
Continue phentermine 37.5 mg one tablet once daily     Continue meal plan and exercise      Follow up Feb 2024

## 2023-10-03 NOTE — PROGRESS NOTES
Subjective   Arti Macias is a 33 y.o. female presents today for a follow up of DM Type 2. Initial diagnosis with diabetes was 12-15 year ago. (+) family history of diabetes in her dad.    Known complications include: obesity, HTN     Previously seeing Dr. Dowell through [facility].   Last visit with me 6/2023  A1c 7.4% 10/2/2023.  Previous A1c 7.6% in 6/2023  She has been on metformin since diagnosis  She also has struggled with weight her whole life   Highest weight 355-360 lbs.     Since last visit, she continues phentermine  She feels diet has not been as tuned in as before  She loves ice cream and was eating more ice cream this summer.    She is taking 1 full tablet of phentermine   Tolerating well    She plans to see reproductive endo in Feb.    Her goal is to lose another 10-20 lbs and lower her A1c to 6.5%      Current diabetes regimen is as follows:   Metformin  mg twice daily      The patient is not currently checking the blood glucose     Hypoglycemia frequency: Denies  Hypoglycemia awareness: Yes     Regarding symptoms of hyperglycemia, the patient is not experiencing any symptoms such as polyuria, polydipsia, nocturia or rapid weight loss or blurry vision. The patient comes into the office today with wanting to lose more weight.        ROS  General: no fever, chills or acute changes in weight in the last 6 months  Skin: no rashes, pruritis or dry skin  Cardiac: denies chest pain, heart palpitations or orthopnea  Pulmonary: denies wheezing, productive cough or exertional dyspnea      Objective    Physical Exam  Blood pressure 134/82, pulse 83, temperature 36.7 °C (98 °F), temperature source Tympanic, weight 132 kg (291 lb).  General: not in acute distress, cooperative   Respiratory: normal respiratory effort  Musculoskeletal: normal gait       Current Outpatient Medications:     labetalol (Normodyne) 100 mg tablet, Take 1 tablet (100 mg) by mouth in the morning and 1 tablet (100 mg) in the evening.,  Disp: 180 tablet, Rfl: 1    metFORMIN (Glucophage) 1,000 mg tablet, Take by mouth., Disp: , Rfl:     phentermine (Adipex-P) 37.5 mg tablet, Take 1 tablet (37.5 mg) by mouth once daily., Disp: 28 tablet, Rfl: 2    Assessment/Plan   Obesity:   Type 2 diabetes with hyperglycemia:   -  A1c continues to improved.  She is working hard to lose weight and lower her A1c.  Goal is to see reproductive endo for Clomid in Feb.  Her goal is lose another 10-20 lbs and get A1c 6.5%.  Discussed addition of GLP1 and she still does not want to do an injection.  I even offered Rybelsus and she will think about it.  Discussed benefits of lessening insulin resistance, lowering A1c, and helping with weight loss.  Both phentermine and Rybelsus would need to be stopped when pregnancy.  She understands this.  Overall this is the best she has felt in a long time.  She is happy with A1c and weight loss.      Plan:  Continue phentermine 37.5 mg one tablet once daily   Continue meal plan and exercise    Follow up Feb 2024    Hypertension:   -  BP elevated upon arrival today.  She felt she was rushing to get here.  I rechecked in office and BP improved     Plan ;  Continue labetalol as prescribed

## 2023-10-24 ENCOUNTER — SOCIAL WORK (OUTPATIENT)
Dept: PRIMARY CARE | Facility: CLINIC | Age: 33
End: 2023-10-24
Payer: COMMERCIAL

## 2023-10-24 ASSESSMENT — PATIENT HEALTH QUESTIONNAIRE - PHQ9
9. THOUGHTS THAT YOU WOULD BE BETTER OFF DEAD, OR OF HURTING YOURSELF: NOT AT ALL
7. TROUBLE CONCENTRATING ON THINGS, SUCH AS READING THE NEWSPAPER OR WATCHING TELEVISION: NOT AT ALL
3. TROUBLE FALLING OR STAYING ASLEEP: NEARLY EVERY DAY
6. FEELING BAD ABOUT YOURSELF - OR THAT YOU ARE A FAILURE OR HAVE LET YOURSELF OR YOUR FAMILY DOWN: MORE THAN HALF THE DAYS
SUM OF ALL RESPONSES TO PHQ QUESTIONS 1-9: 9
4. FEELING TIRED OR HAVING LITTLE ENERGY: NOT AT ALL
2. FEELING DOWN, DEPRESSED OR HOPELESS: NOT AT ALL
1. LITTLE INTEREST OR PLEASURE IN DOING THINGS: NOT AT ALL
SUM OF ALL RESPONSES TO PHQ9 QUESTIONS 1 & 2: 0
5. POOR APPETITE OR OVEREATING: SEVERAL DAYS
8. MOVING OR SPEAKING SO SLOWLY THAT OTHER PEOPLE COULD HAVE NOTICED. OR THE OPPOSITE, BEING SO FIGETY OR RESTLESS THAT YOU HAVE BEEN MOVING AROUND A LOT MORE THAN USUAL: NEARLY EVERY DAY
10. IF YOU CHECKED OFF ANY PROBLEMS, HOW DIFFICULT HAVE THESE PROBLEMS MADE IT FOR YOU TO DO YOUR WORK, TAKE CARE OF THINGS AT HOME, OR GET ALONG WITH OTHER PEOPLE: SOMEWHAT DIFFICULT

## 2023-10-24 ASSESSMENT — ANXIETY QUESTIONNAIRES
2. NOT BEING ABLE TO STOP OR CONTROL WORRYING: NEARLY EVERY DAY
1. FEELING NERVOUS, ANXIOUS, OR ON EDGE: NEARLY EVERY DAY
4. TROUBLE RELAXING: NEARLY EVERY DAY
5. BEING SO RESTLESS THAT IT IS HARD TO SIT STILL: NEARLY EVERY DAY
GAD7 TOTAL SCORE: 21
6. BECOMING EASILY ANNOYED OR IRRITABLE: NEARLY EVERY DAY
IF YOU CHECKED OFF ANY PROBLEMS ON THIS QUESTIONNAIRE, HOW DIFFICULT HAVE THESE PROBLEMS MADE IT FOR YOU TO DO YOUR WORK, TAKE CARE OF THINGS AT HOME, OR GET ALONG WITH OTHER PEOPLE: SOMEWHAT DIFFICULT
3. WORRYING TOO MUCH ABOUT DIFFERENT THINGS: NEARLY EVERY DAY
7. FEELING AFRAID AS IF SOMETHING AWFUL MIGHT HAPPEN: NEARLY EVERY DAY

## 2023-10-24 NOTE — PROGRESS NOTES
Collaborative Care ([facility]) Initial Assessment    Session Time  Start: 1:00pm  End: 2:00 pm     Collaborative Care program information (including case discussion with psychiatry, involvement of [facility] and billing when applicable) was provided and discussed with the patient. Patient Indicated understanding and agreed to proceed.   Confirm: Yes    No data recorded      Reason for Visit / Chief Complaint  No chief complaint on file.      Accompanied by: Self  Guardian Status: Self    Review of Symptoms    Sleep   Average Hours Sleep in/Night: 6  Prepares Self for Sleep at Time: 8:00 pm  Usual Wake up Time: 4;30 am,  Sleep Symptoms: interrupted sleep  Sleep Hygiene: fair sleep hygiene    Mood   Symptom Onset/Duration:  None reported  Current Sx: None, denied  Triggers:  none  Past Sx: None, denied    Anxiety   Symptom Onset/Duration:  10 yrs ago onset.  Current Sx: feeling nervous/anxious/on edge, difficulty stopping/controlling worry, worrying too much, trouble relaxing, and feeling fidgety/restless  Panic / Somatic Sx:  Feels like she has cari panic attacks  Triggers:  Work and big groups of people  Past Sx: feeling nervous/anxious/on edge, difficulty stopping/controlling worry, worrying too much, and trouble relaxing    Self-Esteem / Self-Image   Self Esteem Rating (1-10 Scale, 10 being high): 7  Self-Esteem / Self Image Sx: good self-confidence    Appetite   Description of Overall Appetite: good appetite  Eating Behaviors: prepares meals, skips meals, and binge eats  Concerns with appetite:  None    Anger / Irritability  Symptoms of Anger / Irritability:  On edge sometimes      Communication / Self Expression  Communication Style & Concerns: None    Trauma    Symptoms Onset/Duration: None reported  Traumatic Experiences: none, denied  Current Symptoms Related to Traumatic Experience: none reported  Triggers: N/A    Grief / Loss / Adjustment   Symptom Onset/Duration: None reported  Current Sx:   Factors of Grief / Loss  / Adjustment:     Hallucinations / Delusions   Hallucinations & Delusions Experienced:     Learning Concerns / Memory   Learning Concerns & Sx: hx of ADHD/ADD  Memory Concerns & Sx: none, denied    Functional impairment   Impacting ADL's: no impairment   Impacting IADL's: No impairment  Impacting Ability : No impairment    Associated Medical Concerns   Potential Associated Factors: diabetes and hypertension      Comprehensive Behavioral Health History     Medications  Current Mental Health Medications:   None reported    Past Mental Health Medications:   Prozac as a child--10-10 yrs old    Concerns / challenges / barriers with taking medications? No concerns    Open to medication recommendations from consulting psychiatrist? Yes    Do you ever forget to take your medication? Yes  If yes, how often? N/A    Mental Health Treatment History  Mental Health Treatment: individual therapy and family therapy  Reason/When/Where/Outcome: Anger and temper tantrums in preteen and teenageyears.    Risk History  Suicidal Thoughts/Method/Intent/Plan: None, denied  Suicide Attempts/Preparations: None, denied  Number of Suicide Attempts: 0  Access to Firearms/Lethal Means: access to firearms/lethal means  Non-Suicidal Self Injury: None, denied  Last [place] Risk Score:    Protective Factors: N/A    Violence: None, denied  Homicidal Thoughts/Method/Plan/Intent: None, denied  Homicidal Attempts/Preparations: None, denied  Number of Attempts: 0      Substance Use History    Substances    Social History     Substance and Sexual Activity   Alcohol Use Not Currently    Comment: A couple times a year     Social History     Substance and Sexual Activity   Drug Use Never       Substance Current Use                       Addiction Treatment     Types of Addiction Treatment:  None reported  Currently Sober? Yes     Status/Length of Sobriety: N/A    Family History    Mental Health / Conditions    Family Member Condition / Diagnosis Medications  "/ Side Effects   Depression Depression Unknown                    Substance Use    Family Member Substance Current Use   Father Alcohol Yes                      History of Suicide    Family Member Details   None reported             Social History    Housing   Living Situation: lives with spouse  Safe Housing Conditions / Feels Safe in Home: Yes    Employment  Current Employment: employed  Current Concerns/Challenges: Yes, describe:      Income   Current Concerns/Challenges: No  Receive Benefits/Assistance: No    Education   Status / Level of Education: [employer]    Legal   Legal Considerations: None, denied    Relationships   S/O:  Spouse  Parents/Guardian: Both parents   Siblings: One sister and two step brothers  Friends: Yes         Active Duty? No  Are you a ?   Branch Area:   Were you in combat?   Discharge Status:   Do you receive VA Benefits:     Sexuality / Gender   Concerns with Sexuality/Gender: None, denied  Sexual Orientation: heterosexual    Preferred Gender Pronouns / Identity: No pronouns/use name    Transportation   Transportation Concerns: None, denied    Religious/ Spirituality   Are you Yazidism or Spiritual: No  Religious / Practice: Non-Judaism  Spiritual Practice: None, denied    Coping / Strengths / Supports   Coping:  exercise and watching TV  Strengths: ambitious, confident, and funny  Supports: Spouse      Abuse History  Physical Abuse: No  Sexual Abuse: No  Verbal / Emotional Abuse / Bullying (+Cyber): No   Financial Abuse: No  Domestic Violence: No    Assessment Summary  / Plan    Assessment Summary:  What do you want to work on/get out of collaborative care? \"How to deal with anxiety.\"  The pt is 33 yr old female who presents with Generalized Anxiety. The pt scored a JACKIE-7 of 21.  The pt reported that she is very stressed at work.  The pt did not report any overwhelming psychosocial issues.  The pts focus was her work situation.  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  The pt reports that she feels \"taken advantage of\" by her employer because they continue to add more and more on top of her already full schedule.  In addition, the pt reports that she feels that her employer does not listen to she and her fellow coworkers when it comes to work processes and adequate pay. The pts reports that this results in a great amount of frustration and stress which contributes to her feelings of anxiety.  The pt reported that adding all these factors to her hx of ADHD exacerbates things even more. The pt reported that she also has a habit of ruminating about issues which contribute to her anxiety as well.  The pt is seeking counseling to address these issues.  The pt does not want to take any psychotropic medications at this time.    Plan:   Psych consult - ongoing, bi-weekly, [id number] interventions, provide psycho-education, provide appropriate tx referrals, and provide appropriate resources    No follow-ups on file.    Provisional Findings / Impressions  Primary: Generalized Anxiety     Secondary: None    Goals: Develop behavioral and cognitive strategies to reduce or eliminate anxiety.     Care Plan    There is no care plan documentation to display.       "

## 2023-10-25 ENCOUNTER — DOCUMENTATION (OUTPATIENT)
Dept: BEHAVIORAL HEALTH | Facility: CLINIC | Age: 33
End: 2023-10-25
Payer: COMMERCIAL

## 2023-10-25 NOTE — PROGRESS NOTES
[facility] Psychiatry Consult Note     Arti Macias is a 33 y.o., referred to Collaborative Care for symptoms of anxiety. I have reviewed the patient with the behavioral health manager and reviewed the patient's electronic record. History of ADHD from childhood. She notes that much of life is good, but is very stressed and frustrated by her job. Anxiety has been present for past ten years, at current job for five years, anxiety worse at job.  Not interested in psychiatric medications.    Current Meds:  No current psych meds, but on phentermine for weight management that she just started this 3 weeks ago.    Recommendations:   Patient prefers counseling and this is reasonable option  Primary care should assess if phentermine is contributing to anxiety (its a stimulant)  Meds could be helpful - if patient changes mind, can start sertraline at 25mg daily, increasing to 50mg at 1 week, then increaaing by 25mg daily every 6-8 weeks up to 200mg until anxiety symptoms are in better control.      Patient Health Questionnaire-9 Score: 9 (10/24/2023  2:35 PM)  JACKIE-7 Total Score: 21 (10/24/2023  2:34 PM)      The above treatment considerations and suggestions are based on consultations with the patient's care manager and a review of information available in the electronic medical record. I have not personally examined the patient. All recommendations should be implemented with consideration of the patient's relevant prior history and current clinical status. Please feel free to call me with any questions about the care of this patient.

## 2023-11-01 ENCOUNTER — DOCUMENTATION (OUTPATIENT)
Dept: PRIMARY CARE | Facility: CLINIC | Age: 33
End: 2023-11-01
Payer: COMMERCIAL

## 2023-11-01 DIAGNOSIS — F41.9 ANXIETY: Primary | ICD-10-CM

## 2023-11-01 PROCEDURE — 99492 1ST PSYC COLLAB CARE MGMT: CPT | Performed by: INTERNAL MEDICINE

## 2023-11-09 ENCOUNTER — SOCIAL WORK (OUTPATIENT)
Dept: PRIMARY CARE | Facility: CLINIC | Age: 33
End: 2023-11-09
Payer: COMMERCIAL

## 2023-11-09 ASSESSMENT — PATIENT HEALTH QUESTIONNAIRE - PHQ9
3. TROUBLE FALLING OR STAYING ASLEEP: SEVERAL DAYS
5. POOR APPETITE OR OVEREATING: NEARLY EVERY DAY
5. POOR APPETITE OR OVEREATING: NEARLY EVERY DAY
10. IF YOU CHECKED OFF ANY PROBLEMS, HOW DIFFICULT HAVE THESE PROBLEMS MADE IT FOR YOU TO DO YOUR WORK, TAKE CARE OF THINGS AT HOME, OR GET ALONG WITH OTHER PEOPLE: VERY DIFFICULT
1. LITTLE INTEREST OR PLEASURE IN DOING THINGS: NOT AT ALL
8. MOVING OR SPEAKING SO SLOWLY THAT OTHER PEOPLE COULD HAVE NOTICED. OR THE OPPOSITE, BEING SO FIGETY OR RESTLESS THAT YOU HAVE BEEN MOVING AROUND A LOT MORE THAN USUAL: NEARLY EVERY DAY
1. LITTLE INTEREST OR PLEASURE IN DOING THINGS: NOT AT ALL
4. FEELING TIRED OR HAVING LITTLE ENERGY: SEVERAL DAYS
6. FEELING BAD ABOUT YOURSELF - OR THAT YOU ARE A FAILURE OR HAVE LET YOURSELF OR YOUR FAMILY DOWN: MORE THAN HALF THE DAYS
7. TROUBLE CONCENTRATING ON THINGS, SUCH AS READING THE NEWSPAPER OR WATCHING TELEVISION: NOT AT ALL
9. THOUGHTS THAT YOU WOULD BE BETTER OFF DEAD, OR OF HURTING YOURSELF: NOT AT ALL
6. FEELING BAD ABOUT YOURSELF - OR THAT YOU ARE A FAILURE OR HAVE LET YOURSELF OR YOUR FAMILY DOWN: MORE THAN HALF THE DAYS
SUM OF ALL RESPONSES TO PHQ QUESTIONS 1-9: 11
7. TROUBLE CONCENTRATING ON THINGS, SUCH AS READING THE NEWSPAPER OR WATCHING TELEVISION: NOT AT ALL
9. THOUGHTS THAT YOU WOULD BE BETTER OFF DEAD, OR OF HURTING YOURSELF: NOT AT ALL
3. TROUBLE FALLING OR STAYING ASLEEP OR SLEEPING TOO MUCH: SEVERAL DAYS
2. FEELING DOWN, DEPRESSED OR HOPELESS: SEVERAL DAYS
2. FEELING DOWN, DEPRESSED OR HOPELESS: SEVERAL DAYS
4. FEELING TIRED OR HAVING LITTLE ENERGY: SEVERAL DAYS
8. MOVING OR SPEAKING SO SLOWLY THAT OTHER PEOPLE COULD HAVE NOTICED. OR THE OPPOSITE, BEING SO FIGETY OR RESTLESS THAT YOU HAVE BEEN MOVING AROUND A LOT MORE THAN USUAL: NEARLY EVERY DAY
SUM OF ALL RESPONSES TO PHQ9 QUESTIONS 1 & 2: 1

## 2023-11-09 ASSESSMENT — ANXIETY QUESTIONNAIRES
6. BECOMING EASILY ANNOYED OR IRRITABLE: NEARLY EVERY DAY
1. FEELING NERVOUS, ANXIOUS, OR ON EDGE: NEARLY EVERY DAY
2. NOT BEING ABLE TO STOP OR CONTROL WORRYING: NEARLY EVERY DAY
IF YOU CHECKED OFF ANY PROBLEMS ON THIS QUESTIONNAIRE, HOW DIFFICULT HAVE THESE PROBLEMS MADE IT FOR YOU TO DO YOUR WORK, TAKE CARE OF THINGS AT HOME, OR GET ALONG WITH OTHER PEOPLE: VERY DIFFICULT
5. BEING SO RESTLESS THAT IT IS HARD TO SIT STILL: NEARLY EVERY DAY
7. FEELING AFRAID AS IF SOMETHING AWFUL MIGHT HAPPEN: NEARLY EVERY DAY
3. WORRYING TOO MUCH ABOUT DIFFERENT THINGS: NEARLY EVERY DAY
GAD7 TOTAL SCORE: 21
4. TROUBLE RELAXING: NEARLY EVERY DAY

## 2023-11-09 NOTE — PROGRESS NOTES
"Collaborative Care (CoCM)  Progress Note    Type of Interaction: In Office    Start Time: 2:00 pm    End Time: 3:00 pm        Appointment: Scheduled    Reason for Visit:   Chief Complaint   Patient presents with    Anxiety        Interventions Provided: Problem Solving Treatment, Behavioral Activation, and Homework F/U      Progress Made: Minimum    Response to Intervention:  The pts anxiety remains high. ([id number]).  The pt reported that her anxiety is affecting how she interacts with her spouse.  The pt reported that she has been \"short\" with him over little issues.   The pt reported that she is obsessing over \"little things\" like not keeping things in exact order at home.  The pt reported that her sleep as been affected as well.  The pt reported that she is not sleeping and she feels hyper. The pt reported that even though she is taking  phentermine she is binge eating and over eating.  We processed all this in detail and discussed the recommendation by Dr. Ivan regarding the use of phentermine and the possible exacerbation of anxiety. In addition we discussed the fact that phentermine is a stimulant that can cause a speed like feeling thus her sleep issues and irritability.  We discussed starting to consider a healthier natural lifestyle change regarding her eating habits.  We discussed a variety of options that the pt was contemplating. We discussed Dr. Ivan recommendation regarding starting sertraline 25 mg dly.  The pt reported that she would still like to avoid taking this for now.  The pt reported that she was also going to discuss the further use of phentermine with her PCP Dr. Araujo.         Plan: The pt will complete the Challenging Anxious Thoughts exercise.    Care Plan    There is no care plan documentation to display.         There are no Patient Instructions on file for this visit.      Follow Up / Next Appointment:  12/12/23      "

## 2023-11-29 ENCOUNTER — DOCUMENTATION (OUTPATIENT)
Dept: PRIMARY CARE | Facility: CLINIC | Age: 33
End: 2023-11-29
Payer: COMMERCIAL

## 2023-11-29 DIAGNOSIS — F41.9 ANXIETY: Primary | ICD-10-CM

## 2023-11-29 PROCEDURE — 99493 SBSQ PSYC COLLAB CARE MGMT: CPT | Performed by: INTERNAL MEDICINE

## 2023-12-06 ENCOUNTER — TELEPHONE (OUTPATIENT)
Dept: OBSTETRICS AND GYNECOLOGY | Facility: CLINIC | Age: 33
End: 2023-12-06
Payer: COMMERCIAL

## 2023-12-06 NOTE — TELEPHONE ENCOUNTER
Pt calling with c/o left breast lump about marble size that she recently found. Denies pain but is anxious and would like exam.  Appt made for 12/11.

## 2023-12-08 ENCOUNTER — PATIENT MESSAGE (OUTPATIENT)
Dept: ENDOCRINOLOGY | Facility: CLINIC | Age: 33
End: 2023-12-08
Payer: COMMERCIAL

## 2023-12-08 DIAGNOSIS — E66.01 OBESITY, CLASS III, BMI 40-49.9 (MORBID OBESITY) (MULTI): ICD-10-CM

## 2023-12-11 ENCOUNTER — OFFICE VISIT (OUTPATIENT)
Dept: OBSTETRICS AND GYNECOLOGY | Facility: CLINIC | Age: 33
End: 2023-12-11
Payer: COMMERCIAL

## 2023-12-11 VITALS
DIASTOLIC BLOOD PRESSURE: 84 MMHG | SYSTOLIC BLOOD PRESSURE: 128 MMHG | BODY MASS INDEX: 48.82 KG/M2 | WEIGHT: 293 LBS | HEIGHT: 65 IN

## 2023-12-11 DIAGNOSIS — N63.0 MASS OF BREAST, UNSPECIFIED LATERALITY: Primary | ICD-10-CM

## 2023-12-11 DIAGNOSIS — E61.1 IRON DEFICIENCY: Primary | ICD-10-CM

## 2023-12-11 DIAGNOSIS — I10 PRIMARY HYPERTENSION: ICD-10-CM

## 2023-12-11 PROCEDURE — 3079F DIAST BP 80-89 MM HG: CPT | Performed by: OBSTETRICS & GYNECOLOGY

## 2023-12-11 PROCEDURE — 3051F HG A1C>EQUAL 7.0%<8.0%: CPT | Performed by: OBSTETRICS & GYNECOLOGY

## 2023-12-11 PROCEDURE — 99213 OFFICE O/P EST LOW 20 MIN: CPT | Performed by: OBSTETRICS & GYNECOLOGY

## 2023-12-11 PROCEDURE — 3074F SYST BP LT 130 MM HG: CPT | Performed by: OBSTETRICS & GYNECOLOGY

## 2023-12-11 PROCEDURE — 1036F TOBACCO NON-USER: CPT | Performed by: OBSTETRICS & GYNECOLOGY

## 2023-12-11 RX ORDER — PHENTERMINE HYDROCHLORIDE 37.5 MG/1
37.5 TABLET ORAL DAILY
Qty: 28 TABLET | Refills: 1 | Status: SHIPPED | OUTPATIENT
Start: 2023-12-11 | End: 2023-12-20 | Stop reason: SINTOL

## 2023-12-11 RX ORDER — FERROUS SULFATE 325(65) MG
25 TABLET, DELAYED RELEASE (ENTERIC COATED) ORAL EVERY OTHER DAY
COMMUNITY

## 2023-12-11 RX ORDER — LABETALOL 100 MG/1
100 TABLET, FILM COATED ORAL EVERY 12 HOURS
Qty: 180 TABLET | Refills: 0 | Status: SHIPPED | OUTPATIENT
Start: 2023-12-11 | End: 2024-03-06

## 2023-12-11 ASSESSMENT — PAIN SCALES - GENERAL: PAINLEVEL: 0-NO PAIN

## 2023-12-11 NOTE — TELEPHONE ENCOUNTER
From: Arti Macias  To: Vickie Phelps, APRN-CNP  Sent: 12/8/2023 10:00 PM EST  Subject: Phentermine prescription     Hi Dr Phelps,    I just found out that the [facility] I was getting my prescription at closed. Can you send a new prescription to [facility] in [place]? The address there is [address] [place] [address]. My last dose I have is for Thursday and then I am out. Please message me if you have any questions.     Thank you!

## 2023-12-11 NOTE — PROGRESS NOTES
"Subjective   Arti Macias is an 33 y.o. female who presents for breast complaint  Breast lump: left, feels similar to right, which pt had been having followed by breast surgeon  Pain: absent  Duration: last cycle  Nipple discharge: no  Axillary lymph nodes: no  Nipple retraction: absent  Skin changes: absent  Change during menstrual cycle: no   Objective   /84   Ht 1.651 m (5' 5\")   Wt 134 kg (295 lb)   LMP 11/25/2023 (Exact Date)   BMI 49.09 kg/m²   Constitutional: well developed, well nourished in no acute distress  Breast: dense breast tissue palpated in LUQ, ?mass vs glandular tissue; right palpable dense breast tissue, firmer, round, mobile masses noted in UOQ noted; no skin changes/LINO/nipple changes      Assessment/Plan   Problem List Items Addressed This Visit    None  Visit Diagnoses         Codes    Mass of breast, unspecified laterality    -  Primary N63.0    Relevant Orders    BI mammo bilateral diagnostic          Followup prn  "

## 2023-12-12 ENCOUNTER — APPOINTMENT (OUTPATIENT)
Dept: PRIMARY CARE | Facility: CLINIC | Age: 33
End: 2023-12-12
Payer: COMMERCIAL

## 2023-12-20 ENCOUNTER — PATIENT MESSAGE (OUTPATIENT)
Dept: ENDOCRINOLOGY | Facility: CLINIC | Age: 33
End: 2023-12-20
Payer: COMMERCIAL

## 2023-12-20 NOTE — TELEPHONE ENCOUNTER
From: Arti Macias  To: Vickie Phelps, APRN-CNP  Sent: 12/20/2023 11:22 AM EST  Subject: Stopping Phentermine     Hi Dr. Phelps,    After discussing with the  and him consulting with the therapist and thinking about my anxiety, it has severely increased and my mood has gotten worse. I want to stop the phentermine. I just wanted to know if I need to wean myself off of it or just stop taking it. Sometimes I don’t take it on the weekends and see a noticeable difference in my mood and anxiety. My weight has stayed the same fluctuating a couple pounds up and down. If we can access the situation and my weight at my next appointment that would be great.     Thank you!

## 2024-01-17 ENCOUNTER — ANCILLARY PROCEDURE (OUTPATIENT)
Dept: RADIOLOGY | Facility: CLINIC | Age: 34
End: 2024-01-17
Payer: COMMERCIAL

## 2024-01-17 VITALS — HEIGHT: 65 IN | BODY MASS INDEX: 48.82 KG/M2 | WEIGHT: 293 LBS

## 2024-01-17 DIAGNOSIS — N63.0 MASS OF BREAST, UNSPECIFIED LATERALITY: ICD-10-CM

## 2024-01-17 PROCEDURE — 76982 USE 1ST TARGET LESION: CPT

## 2024-01-17 PROCEDURE — 77066 DX MAMMO INCL CAD BI: CPT

## 2024-01-17 PROCEDURE — 77062 BREAST TOMOSYNTHESIS BI: CPT | Performed by: RADIOLOGY

## 2024-01-17 PROCEDURE — 76983 USE EA ADDL TARGET LESION: CPT

## 2024-01-17 PROCEDURE — 76642 ULTRASOUND BREAST LIMITED: CPT | Mod: 50

## 2024-01-17 PROCEDURE — 76642 ULTRASOUND BREAST LIMITED: CPT | Performed by: RADIOLOGY

## 2024-01-17 PROCEDURE — 77066 DX MAMMO INCL CAD BI: CPT | Performed by: RADIOLOGY

## 2024-01-19 ENCOUNTER — HOSPITAL ENCOUNTER (OUTPATIENT)
Dept: RADIOLOGY | Facility: EXTERNAL LOCATION | Age: 34
Discharge: HOME | End: 2024-01-19

## 2024-01-19 DIAGNOSIS — N63.20 MASS OF LEFT BREAST, UNSPECIFIED QUADRANT: Primary | ICD-10-CM

## 2024-02-02 ENCOUNTER — LAB (OUTPATIENT)
Dept: LAB | Facility: LAB | Age: 34
End: 2024-02-02
Payer: COMMERCIAL

## 2024-02-02 DIAGNOSIS — E66.01 OBESITY, CLASS III, BMI 40-49.9 (MORBID OBESITY) (MULTI): ICD-10-CM

## 2024-02-02 DIAGNOSIS — E61.1 IRON DEFICIENCY: ICD-10-CM

## 2024-02-02 LAB
EST. AVERAGE GLUCOSE BLD GHB EST-MCNC: 180 MG/DL
GLIADIN PEPTIDE IGA SER IA-ACNC: <1 U/ML
GLIADIN PEPTIDE IGG SER IA-ACNC: NORMAL
HBA1C MFR BLD: 7.9 %
TTG IGA SER IA-ACNC: <1 U/ML
TTG IGG SER IA-ACNC: NORMAL

## 2024-02-02 PROCEDURE — 36415 COLL VENOUS BLD VENIPUNCTURE: CPT

## 2024-02-02 PROCEDURE — 83036 HEMOGLOBIN GLYCOSYLATED A1C: CPT

## 2024-02-02 PROCEDURE — 82784 ASSAY IGA/IGD/IGG/IGM EACH: CPT

## 2024-02-02 PROCEDURE — 86364 TISS TRNSGLTMNASE EA IG CLAS: CPT

## 2024-02-02 PROCEDURE — 86258 DGP ANTIBODY EACH IG CLASS: CPT

## 2024-02-02 PROCEDURE — 83516 IMMUNOASSAY NONANTIBODY: CPT | Performed by: INTERNAL MEDICINE

## 2024-02-05 ENCOUNTER — OFFICE VISIT (OUTPATIENT)
Dept: ENDOCRINOLOGY | Facility: CLINIC | Age: 34
End: 2024-02-05
Payer: COMMERCIAL

## 2024-02-05 VITALS
BODY MASS INDEX: 48.82 KG/M2 | SYSTOLIC BLOOD PRESSURE: 136 MMHG | DIASTOLIC BLOOD PRESSURE: 83 MMHG | WEIGHT: 293 LBS | HEIGHT: 65 IN | HEART RATE: 91 BPM

## 2024-02-05 DIAGNOSIS — E11.65 TYPE 2 DIABETES MELLITUS WITH HYPERGLYCEMIA, WITHOUT LONG-TERM CURRENT USE OF INSULIN (MULTI): Primary | ICD-10-CM

## 2024-02-05 DIAGNOSIS — E61.1 IRON DEFICIENCY: Primary | ICD-10-CM

## 2024-02-05 DIAGNOSIS — E66.01 OBESITY, CLASS III, BMI 40-49.9 (MORBID OBESITY) (MULTI): ICD-10-CM

## 2024-02-05 LAB
GLIADIN PEPTIDE IGG SER IA-ACNC: <0.56 FLU (ref 0–4.99)
IGA SERPL-MCNC: 199 MG/DL (ref 70–400)
TTG IGG SER IA-ACNC: <0.82 FLU (ref 0–4.99)

## 2024-02-05 PROCEDURE — 3079F DIAST BP 80-89 MM HG: CPT | Performed by: NURSE PRACTITIONER

## 2024-02-05 PROCEDURE — 1036F TOBACCO NON-USER: CPT | Performed by: NURSE PRACTITIONER

## 2024-02-05 PROCEDURE — 3075F SYST BP GE 130 - 139MM HG: CPT | Performed by: NURSE PRACTITIONER

## 2024-02-05 PROCEDURE — 3051F HG A1C>EQUAL 7.0%<8.0%: CPT | Performed by: NURSE PRACTITIONER

## 2024-02-05 PROCEDURE — 99214 OFFICE O/P EST MOD 30 MIN: CPT | Performed by: NURSE PRACTITIONER

## 2024-02-05 NOTE — PROGRESS NOTES
"  Subjective   Arti Macias is a 33 y.o. female presents today for a follow up of DM Type 2. Initial diagnosis with diabetes was 12-15 year ago. (+) family history of diabetes in her dad.    Known complications include: obesity, HTN     Previously seeing Dr. Dowell through [facility].   Last visit with me 6/2023  A1c 7.9% in 2/2024.  Previous A1c 7.4% 10/2/2023.   She has been on metformin since diagnosis  She also has struggled with weight her whole life   Highest weight 355-360 lbs.     Since last visit, she stopped phentermine in 12/2023 due to mood and anxiety   She is disappointed because she likes the energy it brings to her   It has also helped with her weight loss    She plans to see reproductive endo in Feb.    She is also quitting her   It is getting to expensive  She mentions this is the life of Arti referring to yo-yo weight loss    Highest weight 350 lbs 3-5 years ago   Starting weight 313 lbs in March 2023  Weight in June 2023 293 lbs  Today back up to 299 lbs.      Current diabetes regimen is as follows:   Metformin  mg twice daily      The patient is not currently checking the blood glucose     Hypoglycemia frequency: Denies  Hypoglycemia awareness: Yes     Regarding symptoms of hyperglycemia, the patient is not experiencing any symptoms such as polyuria, polydipsia, nocturia or rapid weight loss or blurry vision. The patient comes into the office today with wanting to lose more weight.        ROS  General: no fever, chills or acute changes in weight in the last 6 months  Skin: no rashes, pruritis or dry skin  Cardiac: denies chest pain, heart palpitations or orthopnea  Pulmonary: denies wheezing, productive cough or exertional dyspnea      Objective    Physical Exam  Blood pressure 136/83, pulse 91, height 1.651 m (5' 5\"), weight 136 kg (299 lb).  General: not in acute distress, cooperative   Respiratory: normal respiratory effort  Musculoskeletal: normal gait       Current " Outpatient Medications:     ferrous sulfate 325 (65 Fe) MG EC tablet, Take 65 mg by mouth 3 times a day with meals. Do not crush, chew, or split., Disp: , Rfl:     labetalol (Normodyne) 100 mg tablet, Take 1 tablet (100 mg) by mouth every 12 hours., Disp: 180 tablet, Rfl: 0    metFORMIN (Glucophage) 1,000 mg tablet, Take by mouth., Disp: , Rfl:     Assessment/Plan   Obesity BMI >40 :   Type 2 diabetes with hyperglycemia without long term use insulin:   -A1c not at goal.  It is up from previous.  She had to stop the phentermine due to irritability.  It affected her mood greatly.  She is also stopping working with her  due to cost.  She reports this is her life and the cycles that she goes there trying to lose weight.  I recommended psychology and will refer her to [facility].  She is still unwilling to try any other medication besides metformin due to the risk of side effects.  Though she would be an excellent candidate for GLP-1 and would also help with weight loss prior to pregnancy.  Discussed that she is unable to use during pregnancy but could use to get A1c to control and then would need to switch to insulin.  She has a follow-up with reproductive endocrine at the end of the month.  I do think if she started Rybelsus which is the oral GLP-1 she may have more side effects than the once weekly injection.  Overall she is happy she is not came back as much weight and she is surprised that her A1c is not higher.  Discussed that more importantly the next A1c will be a good evaluation.      Plan:   Mariana will call you to set up appt   Get fasting labs prior to next visit   Follow 4-5 months       Hyperlipidemia:   - LDL not at goal.  Not on meds.      Plan:   Repeat labs     Addendum:   Email sent to [facility].  Vickie Phelps, APRN-CNP

## 2024-02-05 NOTE — PATIENT INSTRUCTIONS
Mariana will call you to set up appt     Get fasting labs prior to next visit     Follow 4-5 months

## 2024-02-06 PROBLEM — E66.813 OBESITY, CLASS III, BMI 40-49.9 (MORBID OBESITY): Status: ACTIVE | Noted: 2023-02-18

## 2024-02-06 PROBLEM — E11.65 TYPE 2 DIABETES MELLITUS WITH HYPERGLYCEMIA, WITHOUT LONG-TERM CURRENT USE OF INSULIN (MULTI): Status: ACTIVE | Noted: 2023-02-18

## 2024-02-16 ASSESSMENT — LIFESTYLE VARIABLES
HISTORY_ALCOHOL_USE: NO
TOBACCO_USE: NO

## 2024-02-23 ENCOUNTER — CONSULT (OUTPATIENT)
Dept: ENDOCRINOLOGY | Facility: CLINIC | Age: 34
End: 2024-02-23
Payer: COMMERCIAL

## 2024-02-23 VITALS
DIASTOLIC BLOOD PRESSURE: 86 MMHG | SYSTOLIC BLOOD PRESSURE: 135 MMHG | WEIGHT: 293 LBS | BODY MASS INDEX: 48.82 KG/M2 | TEMPERATURE: 97.5 F | HEIGHT: 65 IN | HEART RATE: 86 BPM

## 2024-02-23 DIAGNOSIS — Z11.59 ENCOUNTER FOR SCREENING FOR OTHER VIRAL DISEASES: ICD-10-CM

## 2024-02-23 DIAGNOSIS — N91.3 PRIMARY OLIGOMENORRHEA: Primary | ICD-10-CM

## 2024-02-23 DIAGNOSIS — E28.2 PCOS (POLYCYSTIC OVARIAN SYNDROME): ICD-10-CM

## 2024-02-23 DIAGNOSIS — Z11.3 SCREENING FOR STDS (SEXUALLY TRANSMITTED DISEASES): ICD-10-CM

## 2024-02-23 DIAGNOSIS — Z13.29 SCREENING FOR THYROID DISORDER: ICD-10-CM

## 2024-02-23 DIAGNOSIS — N91.3 PRIMARY OLIGOMENORRHEA: ICD-10-CM

## 2024-02-23 DIAGNOSIS — Z01.812 ENCOUNTER FOR PREPROCEDURAL LABORATORY EXAMINATION: ICD-10-CM

## 2024-02-23 DIAGNOSIS — Z01.83 ENCOUNTER FOR RH BLOOD TYPING: ICD-10-CM

## 2024-02-23 DIAGNOSIS — N93.9 ABNORMAL UTERINE BLEEDING: ICD-10-CM

## 2024-02-23 DIAGNOSIS — Z13.1 SCREENING FOR DIABETES MELLITUS: ICD-10-CM

## 2024-02-23 DIAGNOSIS — Z31.41 FERTILITY TESTING: ICD-10-CM

## 2024-02-23 DIAGNOSIS — Z13.71 SCREENING FOR GENETIC DISEASE CARRIER STATUS: ICD-10-CM

## 2024-02-23 DIAGNOSIS — E66.01 MORBID OBESITY WITH BMI OF 50.0-59.9, ADULT (MULTI): ICD-10-CM

## 2024-02-23 PROCEDURE — 99205 OFFICE O/P NEW HI 60 MIN: CPT | Performed by: OBSTETRICS & GYNECOLOGY

## 2024-02-23 PROCEDURE — 99215 OFFICE O/P EST HI 40 MIN: CPT | Performed by: OBSTETRICS & GYNECOLOGY

## 2024-02-23 ASSESSMENT — PATIENT HEALTH QUESTIONNAIRE - PHQ9
2. FEELING DOWN, DEPRESSED OR HOPELESS: NOT AT ALL
1. LITTLE INTEREST OR PLEASURE IN DOING THINGS: NOT AT ALL
SUM OF ALL RESPONSES TO PHQ9 QUESTIONS 1 AND 2: 0

## 2024-02-23 ASSESSMENT — COLUMBIA-SUICIDE SEVERITY RATING SCALE - C-SSRS
2. HAVE YOU ACTUALLY HAD ANY THOUGHTS OF KILLING YOURSELF?: NO
6. HAVE YOU EVER DONE ANYTHING, STARTED TO DO ANYTHING, OR PREPARED TO DO ANYTHING TO END YOUR LIFE?: NO
1. IN THE PAST MONTH, HAVE YOU WISHED YOU WERE DEAD OR WISHED YOU COULD GO TO SLEEP AND NOT WAKE UP?: NO

## 2024-02-23 ASSESSMENT — PAIN SCALES - GENERAL: PAINLEVEL: 0-NO PAIN

## 2024-02-23 NOTE — PROGRESS NOTES
In Person    NEW FERTILITY PATIENT VISIT    Referred by: Referred by:: Previous patient before  Accompanied today by: Who is accompanying you to your visit::       Arti Macias is a 33 y.o.  female who presents with Please tell us your reason for this visit today: Infertility; Fertility preservation      Relationship Status:      x 7 years     Length of conception attempts: 8 years      PRIOR EVALUATION / TREATMENT  See labs below  Has seen me in - was not able to proceed at that time due to HTN and high HgA1C    Interval history-  Was on phentermine March-2023; lost ~ 50 lbs  Has gained some back (~ 20 lbs)  Had to stop phentermine due to side effects    Known DM2 and HTN  Has [facility] Endocrinologist and PCP  On labetatolol for  BID  Metformin for DM2 1000 BID    Has been recommended GLP1 agonist; has hesistations due to newness of drug and side effects    Previously had irregular periods and had some concerns about PCOS due to oligomenorrhea and hirsutism, also hair thinning  Has been having regular periods for the last 2 years in context of weight loss    Saw Dr. Ashford in   The following are recommended to low pregnancy risk: BMI < 50, stable BP on a pregnancy safe medication, Hgb A1c < 6.5 on appropriate pregnancy medications.       Prior Labs   Latest Reference Range & Units Most Recent   Hemoglobin A1C see below % 7.9 (H)  24 07:54   Progesterone ng/mL 0.5  10/30/19 16:35   PROLACTIN 6.0 - 20.0 ug/L 13.4  10/30/19 16:35   Thyroid Stimulating Hormone 0.44 - 3.98 mIU/L 1.61  23 06:49   Vitamin D, 25-Hydroxy, Total ng/mL 29 !  20 16:05   17-Hydroxyprogesterone ng/dL 21  10/30/19 16:35   DHEA Sulfate 12 - 379 ug/dL 189  10/30/19 16:35   Testosterone, Free 0.1 - 6.4 pg/mL 2.4  10/30/19 16:35   GLUCOSE 74 - 99 mg/dL 197 (H)  23 06:49   Estimated Average Glucose Not Established mg/dL 180  24 07:54   Testosterone, Total, LC-MS/MS 2 - 45  ng/dL 17  10/30/19 16:35   (H): Data is abnormally high  !: Data is abnormal      Latest Reference Range & Units Most Recent   GLUCOSE 74 - 99 mg/dL 197 (H)  6/28/23 06:49   SODIUM 136 - 145 mmol/L 138  6/28/23 06:49   POTASSIUM 3.5 - 5.3 mmol/L 4.4  6/28/23 06:49   CHLORIDE 98 - 107 mmol/L 103  6/28/23 06:49   Bicarbonate 21 - 32 mmol/L 23  6/28/23 06:49   Anion Gap 10 - 20 mmol/L 16  6/28/23 06:49   Blood Urea Nitrogen 6 - 23 mg/dL 10  6/28/23 06:49   Creatinine 0.50 - 1.05 mg/dL 0.78  6/28/23 06:49   Calcium 8.6 - 10.6 mg/dL 9.6  6/28/23 06:49   Albumin 3.4 - 5.0 g/dL 4.0  6/28/23 06:49   Alkaline Phosphatase 33 - 110 U/L 37  6/28/23 06:49   ALT 7 - 45 U/L 10  6/28/23 06:49   AST 9 - 39 U/L 10  6/28/23 06:49   Bilirubin Total 0.0 - 1.2 mg/dL 0.4  6/28/23 06:49   HDL CHOLESTEROL mg/dL 36.6 !  6/28/23 06:49   Cholesterol/HDL Ratio  5.3 !  6/28/23 06:49   LDL Calculated 0 - 99 mg/dL 109 (H)  6/28/23 06:49   VLDL 0 - 40 mg/dL 49 (H)  6/28/23 06:49   TRIGLYCERIDES 0 - 149 mg/dL 246 (H)  6/28/23 06:49   Non HDL Cholesterol mg/dL 158  6/28/23 06:49   FERRITIN 8 - 150 ng/mL 44  10/2/23 07:23   Total Protein 6.4 - 8.2 g/dL 7.0  6/28/23 06:49   IRON 35 - 150 ug/dL 36  10/2/23 07:23   CHOLESTEROL 0 - 199 mg/dL 195  6/28/23 06:49   TIBC 240 - 445 ug/dL 371  10/2/23 07:23   UIBC 110 - 370 ug/dL 335  10/2/23 07:23   % Saturation 25 - 45 % 10 (L)  10/2/23 07:23   (H): Data is abnormally high  !: Data is abnormal  (L): Data is abnormally low     Latest Reference Range & Units Most Recent   Varicella Zoster, IgG NEGATIVE  POSITIVE  10/30/19 16:35   Rubella, IgG IU/ML 72.7  10/30/19 16:35       Transvaginal ultra sound: 2019  The uterus is anteverted and measures 6.0 x 3.4 x 3.9 cm. The endometrium is not well-visualized due to body habitus however appears to measure 9.7 mm   The right ovary is not well visualized  The left ovary is visualized and measures 4.3 x 3.4 x 4.3 cm. It is mostly taken up by a simple cyst that measures  3.5 x 3.3 cm.  Additionally there are 4 antral follicles noted    OB Hx     OB History          0    Para   0    Term   0       0    AB   0    Living   0         SAB   0    IAB   0    Ectopic   0    Multiple   0    Live Births   0                 MENSTRUAL HISTORY  LMP: When was your last menstrual period?: Other  Menarche:    Contraception: What (if any) type of birth control do you currently use?: None  Cycle length: What is the average number of days between menstrual cycles?: 22  Describe your bleeding: Describe your bleeding:: Average  Dysmenorrhea: Are your menstrual periods painful?: Yes       ENDOCRINE/INFERTILITY HISTORY  Duration of infertility: If applicable, what is the approximate date you began trying to get pregnant?: More than 5 years  Coital Activity/week: If applicable, how many times a week are you having intercourse, trying to get pregnant during your fertile window?: 1-2  Nipple Discharge: Do you experience any loss of milk or liquid discharge from the breasts?: No  Vision changes: Are you experiencing any vision changes?: No  Headaches: Are you experiencing headaches?: No  Excess hair growth: Are you experiencing persistent or worsening hair growth on the face, breasts or lower abdomen?: No  -Small hair growth on face  Excessive hair loss: Are you experiencing loss of hair from your scalp?: Yes  Acne: Are you experiencing acne?: No  Oily skin: Oily skin?: No  Recent weight change  Weight gain: Are you experiencing any increase in weight?: No  Weight loss: Are you experiencing any decrease in weight?: No  Exercise more than 3 times a week: Exercise more than 3 times a week?: No  -Has lost ~ 50 lbs and regained 20  -Current BMI=51    GYN HISTORY   Have you ever been diagnosed with a sexually transmitted disease? Have you ever been diagnosed with a sexually transmitted disease?: No  Please select all that are applicable:    Have you ever had Pelvic Inflammatory Disease? Have you ever  had Pelvic Inflammatory Disease?: No  Have you had an abnormal PAP smear? Have you had an abnormal PAP smear?: Yes  Date & Result of last PAP smear: 2022 WNL  Have you ever had an abnormal Mammogram? Have you ever had an abnormal mammogram?: No  Yes  Date & result of your last mammogram:   2023- due for repeat in 6 months for surveillance  Do you have pelvic pain? Do you have pelvic pain?: No  How many times per week do you have intercourse? If applicable, how many times a week are you having intercourse, trying to get pregnant during your fertile window?: 1-2  Do you have pain with intercourse? Do you have pain with intercourse?: Yes  Do you use lubricants with intercourse?    Do you have pain with bowel movements? Do you have pain with bowel movements?: No             Do you have pain with a full bladder? Do you have pain with a full bladder?: No    PMH  Past Medical History:   Diagnosis Date    Allergic     Anxiety     Diabetes mellitus (CMS/HCC)     GERD (gastroesophageal reflux disease)     Hypertension     Personal history of diseases of the skin and subcutaneous tissue     History of sebaceous cyst    Personal history of other diseases of the circulatory system 10/30/2019    History of hypertension    Personal history of other diseases of the digestive system     History of cholelithiasis    Personal history of other endocrine, nutritional and metabolic disease     History of diabetes mellitus        MEDICATIONS  Current Outpatient Medications on File Prior to Visit   Medication Sig Dispense Refill    ferrous sulfate 325 (65 Fe) MG EC tablet Take 65 mg by mouth 3 times a day with meals. Do not crush, chew, or split.  Per patient taking 25mg per day      labetalol (Normodyne) 100 mg tablet Take 1 tablet (100 mg) by mouth every 12 hours. 180 tablet 0    metFORMIN (Glucophage) 1,000 mg tablet Take 1 tablet (1,000 mg) by mouth 2 times a day with meals.       No current facility-administered medications on file  prior to visit.        PSH  Past Surgical History:   Procedure Laterality Date    WISDOM TOOTH EXTRACTION          PSYCH HISTORY  Have you ever been diagnosed with a mental health Issue?: No  Have you ever been hospitalized for a mental health disorder?: No       SOCIAL HISTORY  Social History     Tobacco Use    Smoking status: Never     Passive exposure: Never    Smokeless tobacco: Never   Vaping Use    Vaping Use: Never used   Substance Use Topics    Alcohol use: Not Currently     Comment: A couple times a year    Drug use: Never     Occupation: Your occupation::   Have you ever been incarcerated? Have you ever been incarcerated?: No  Do you have a history of domestic violence? Do you have a history of domestic violence?: No  Do you feel safe at home? Do you feel safe at home?: Yes  Do you have a history of any negative sexual experience such as incest or rape? 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  Prior fertility history:   None  PMH:   Obesity, eczema  PSH:   None  Smoking:Partner: Recent or current tobacco use: No  Alcohol Use: Partner: Recent or current alcohol use: No  Drug Use: Partner: Recent or current drug use: No  Medications: Partner Medications: None  Injuries: Partner: Any history of surgeries or injuries in the reproductive area?: No  STD: Have you ever been diagnosed with a sexually transmitted disease?: No  Please select all that are applicable:    SA: Prior Semen Analysis completed?: Yes    SA Results: Where the results normal?: Unsure  SA: [facility], 2019  4.0 cc  45.5 mil/mL  70% motility  55% morph  TMC= 127 mil    FAMILY HISTORY  Family History   Problem Relation Name Age of Onset    No Known Problems Mother      Diabetes Father Pedrito     Prostate cancer Father Pedrito      "Hypertension Father Pedrito     Raynaud syndrome Sister      Heart attack Father's Brother      Diabetes Maternal Grandfather Amari     Diabetes Paternal Grandfather      Prostate cancer Paternal Grandfather      Other (pac maker) Paternal Grandfather         CANCER HISTORY    Breast: Breast Cancer: Please answer Yes, No or Unsure. If Yes, please, identify which family member.: Yes fathers mother who is   Ovarian: Ovarian Cancer: Please answer Yes, No or Unsure. If Yes, please, identify which family member.: No  Colon: Colon Cancer: Please answer Yes, No or Unsure. If Yes, please, identify which family member.: No  Endometrial: Endometrial Cancer: Please answer Yes, No or Unsure. If Yes, please, identify which family member.: No      FAMILY VTE HISTORY  Family History of Blood Clots: Blood Clots: Please answer Yes, No or Unsure. If Yes, please, identify which family member.: Yes grandmother who is  (fathers)      GENETIC HISTORY  Ethnic Background  Patient: Ethnic Background Patient:: White  Partner: Ethnic Background Partner:: White  Genetic Disease in Family  Patient: Patient: Defects and genetic syndromes? Please answer Yes, No or Unsure: No  Partner: Partner: Defects and genetic syndromes? Please answer Yes, No or Unsure: No  Birth Defects in Family  Patient: Patient: Birth defects? Please answer Yes, No or Unsure: No  Partner: Partner: Birth defects? Please answer Yes, No or Unsure: No  Genetic screening performed previously:       BMI:   BMI Readings from Last 1 Encounters:   24 50.75 kg/m²     VITALS:  /86   Pulse 86   Temp 36.4 °C (97.5 °F)   Ht 1.651 m (5' 5\")   Wt 138 kg (305 lb)   LMP 02/15/2024   BMI 50.75 kg/m²     ASSESSMENT   33 y.o.  female with  primary infertility, suspected oligoovulation and now with regular but short cycles  and the following pertinent medical issues: Obesity BMI-51, HTN- on labetalol, DM2 not well controlled (hgA1C 7.9%)  Partner SA: " Normal    COUNSELING  We discussed causes of infertility including hormonal, egg quality issues, structural problems such as endometriosis, adhesions, or tubal problems, uterine factors such as polyps or fibroids, and sperm issues. Reviewed evaluation of such as well. We discussed various methods for achieving pregnancy in some detail including, ovulation induction, insemination, superovulation and IVF.    See below  We discussed the impact of obesity on fertility and pregnancy outcomes, including increased risk of miscarriage, gestational diabetes, preeclampsia, IUGR, and still birth.  We discussed the importance of weight loss for optimizing fertility and pregnancy outcomes.    We again reviewed that pregnancy is not currently safe for patient given HgA1c of 7.9% discussed goal of <6.5% for pregnancy and BMI<50.  We reviewed options including bariatric surgery (pt not interested), GLP-1 agonist, and insulin- all to be discussed with endocrinologist  Encouraged patient to be open minded about GLP-1 agonist; although we do not have data regarding safety in pregnancy it will likely help patient achieve pregnancy at a safe HgA1C and ultimately reduce complication risk for her.  Reviewed she will likely need to switch in insulin when pregnant.       Routine Testing  Fertility Center  STDs Within 1 year   Genetic carrier Waiver/Completed   T&S Within 1 year   AMH Within 1 year   TSH Within 1 year   Rubella/Varicella Within 5 years     BMI Testing  Fertility Center  CBC Within 1 year   CMP Within 1 year   HgbA1c Within 1 year   Mag, Phos, Vit D <18 Within 1 year   MFM > 40  REQ   Wt loss consult > 40 OPT     PLAN  Orders Placed This Encounter   Procedures    Hysterosalpingogram (HSG)    US pelvis transvaginal    FL hysterosalpingogram    Antimullerian Hormone (Amh)    Type And Screen    Rubella Antibody, Igg    Varicella Zoster Antibody, Igg    Hepatitis B surface antigen    Hepatitis C Antibody    HIV-1 and HIV-2  antibodies    Syphilis Screen with Reflex    C. Trachomatis / N. Gonorrhoeae, Amplified Detection    Myriad Foresight Carrier Screen    POCT pregnancy, urine manually resulted       GENETIC SCREENING PATIENT  Ordered    PARTNER  Yes Semen Analysis: Ordered  Yes Genetic screening: Ordered    FOLLOW UP   Consults:  Patient to follow up with Endocrinologist for further management of DM2  Chart to primary nurse for care coordination and patient check list/education  Enroll in Engaged MD  Take prenatal vitamins, vitamin D 2000 IUs daily  Discussed that PAP and mammogram must be updated if appropriate based on age and clinical history and results received before treatment can begin  Discussed that treatment cannot proceed until checklist items are complete   6 week follow up with [facility]  Additional testing for BMI < 18 or > 40:  Done 6/2023, may need repeated prior to treatment    Plan-  Fertility testing ordered today  Repeat SA per patient preference  HSG if desires  Once HgA1C at goal <6.5% will likely recommend Letrozole 5 mg with TIC as initial management strategy  Briefly discussed IUI if indicated  Reviewed IVF BMI <45  Patient will work with endocrinologist and will consider GLP1 agonist- can stay on this until + pregnancy test or can stop with confirmed + ovulation    Lisa Johnson  02/23/2024  9:05 AM

## 2024-02-23 NOTE — PROGRESS NOTES
Spoke with patient regarding new patient visit with Dr. Johnson.  Reviewed with patient New Patient checklist that will be sent to patient via My Chart.  Reviewed with patient [facility] for Genetic Authorization Form to be filled out and reviewed partner testing,  and [employer].  Patient states she made her FUV for July 5 and patient is a type 2 diabetic and needs to get her HgBA1C uncontrol.  Patient understands she needs to follow up with her endocrinologist.  Patient also aware of BMI and Dr. Johnson spoke to patient regarding weight loss management.  Zabrina Kat 02/23/2024 11:47 AM

## 2024-02-23 NOTE — PROGRESS NOTES
Boarding Pass Oral/Injectible with TIC/IUI Checklist    Age: 33 y.o.    Provider: Lisa Johnson MD  Primary RN: Zabrina Kat RN  Reasons for Treatment:  DM2 and HTN  Last BMI  24 : 50.75 kg/m²       Past Medical History:   Diagnosis Date    Allergic     Anxiety     Diabetes mellitus (CMS/HCC)     GERD (gastroesophageal reflux disease)     Hypertension     Personal history of diseases of the skin and subcutaneous tissue     History of sebaceous cyst    Personal history of other diseases of the circulatory system 10/30/2019    History of hypertension    Personal history of other diseases of the digestive system     History of cholelithiasis    Personal history of other endocrine, nutritional and metabolic disease     History of diabetes mellitus       Date Done Consultation Results/Comments   *** Medication Protocol Stimulation protocol: ***  Trigger plan: {SAM TRIGGER PLAN:05835}  Adjuncts: {SAM ADJUNCTS:59142}  Notes: ***   *** Procedure Order Placed []     *** MFM Consult Okay to proceed? {SAM Yes, No, N/A:83281}   *** Psych Consult Okay to proceed? {SAM Yes, No, N/A:03062}   *** Genetics Consult Okay to proceed? {SAM Yes, No, N/A:08271}   *** Endocrinologist    Date Done Female Labs Results/Comments   No results found for requested labs within last 365 days. T&S (Q 1 Year) No results found for this or any previous visit (from the past 8760 hour(s)).     No results found for requested labs within last 365 days. Hep B sAg No results found for requested labs within last 365 days.   No results found for requested labs within last 365 days. Hep C AB No results found for requested labs within last 365 days.   No results found for requested labs within last 365 days. HIV No results found for requested labs within last 365 days.   No results found for requested labs within last 365 days. Syphilis No results found for requested labs within last 365 days.   No results found for requested labs within last 365  days. GC/CT GC: No results found for requested labs within last 365 days.  CT: No results found for requested labs within last 365 days.   10/30/2019 Rubella (Q 5 Years) 72.7   10/30/2019 Varicella (Q 5 Years) POSITIVE   2023 TSH 1.61 (Ref range: 0.44 - 3.98 mIU/L)   2024 HgbA1C 7.9 (H; Ref range: see below %)   No results found for requested labs within last 365 days. AMH No results found for requested labs within last 365 days.   *** Carrier Screening Myriad 2bP: ***  {SAM Carrier Screenin}  {SAM Carrier Screening Neg/Pos:91885}   *** Uterine Cavity Eval Pelvic US: ***    HSG: ***    SIS: ***    Hyster: (***)     2022 Pap Smear Lab Results   Component Value Date    CVTFINALDX  2022       SPECIMEN ADEQUACY:       Satisfactory for evaluation.           Endocervical transformation zone component absent.         GENERAL CATEGORIZATION:       Negative for intraepithelial lesion or malignancy.            See interpretation-result.              INTERPRETATION/RESULT:      Negative for intraepithelial lesion or malignancy.         The above diagnosis was rendered at [facility]  [address] [place] [address], CLIA number [id number].   This information is included on the report as part of a CLIA  requirement.             SHANKAR Cardoza (ASCP)  06/10/2022 11:59  DAP2 - 06/10/2022  Screened By:Aniyah Okeefe M.D.        *** Mammogram ***               Date Done Male Labs (required if IUI)   Results/Comments  Oliver Macias  MRN: [id number]   5/10/2023 Hep B sAg NONREACTIVE   5/10/2023 Hep C AB  NONREACTIVE   *** HIV ***   *** Syphilis ***   *** GC/CT GC: ***  CT: ***   *** Carrier Screening ***  {SAM Carrier Screenin}  {SAM Carrier Screening Neg/Pos:09273}   *** Semen Analysis  Volume(mL): ***  Count(million): ***  Motility(%): ***  Motile Count(million): ***   Date Done Miscellaneous Results/Comments   *** BMI Checklist  BMI > 40 or < 18 Added to chart:    Yes; Boarding Pass BMI Checklist (BMI > 40 or < 18)    Date Done Testing Results   6/28/2023 CBC Plt: 347 (Ref range: 150 - 450 x10E9/L)  Hct: 40.2 (Ref range: 36.0 - 46.0 %)   6/28/2023 CMP BUN: 10 (Ref range: 6 - 23 mg/dL)  Cre: 0.78 (Ref range: 0.50 - 1.05 mg/dL)  AST: 10 (Ref range: 9 - 39 U/L)  ALT: 10 (Ref range: 7 - 45 U/L)   2/2/2024 HgbA1C 7.9 (H; Ref range: see below %)   *** MFM clearance required:    BMI > 40 with co-morbidities    BMI > 45- all patients Clearance Letter-Provider Reviewed: ***   No results found for requested labs within last 365 days. Additional Labs (BMI < 18) Magnesium: No results found for requested labs within last 365 days.  Phosphate: No results found for requested labs within last 365 days.  Vitamin D: No results found for requested labs within last 365 days.   *** Weight Loss / Nutrition Consult (must be offered for BMI > 40) {SAM Declined - provider documentation:75848}      *** >= 45 Checklist  Added to chart:   No     MD Completion:  Ectopic Risk: {YES/NO/NA:83218}  Medically Complex: {YES/NO/NA:89192}

## 2024-03-06 DIAGNOSIS — I10 PRIMARY HYPERTENSION: ICD-10-CM

## 2024-03-06 RX ORDER — LABETALOL 100 MG/1
1 TABLET, FILM COATED ORAL EVERY 12 HOURS
Qty: 90 TABLET | Refills: 1 | Status: SHIPPED | OUTPATIENT
Start: 2024-03-06 | End: 2024-03-14 | Stop reason: SDUPTHER

## 2024-03-14 ENCOUNTER — LAB (OUTPATIENT)
Dept: LAB | Facility: LAB | Age: 34
End: 2024-03-14
Payer: COMMERCIAL

## 2024-03-14 ENCOUNTER — OFFICE VISIT (OUTPATIENT)
Dept: PRIMARY CARE | Facility: CLINIC | Age: 34
End: 2024-03-14
Payer: COMMERCIAL

## 2024-03-14 VITALS
HEART RATE: 98 BPM | HEIGHT: 65 IN | TEMPERATURE: 97.9 F | WEIGHT: 293 LBS | DIASTOLIC BLOOD PRESSURE: 76 MMHG | SYSTOLIC BLOOD PRESSURE: 121 MMHG | BODY MASS INDEX: 48.82 KG/M2 | OXYGEN SATURATION: 97 %

## 2024-03-14 DIAGNOSIS — E61.1 IRON DEFICIENCY: ICD-10-CM

## 2024-03-14 DIAGNOSIS — Z13.71 SCREENING FOR GENETIC DISEASE CARRIER STATUS: ICD-10-CM

## 2024-03-14 DIAGNOSIS — E11.65 TYPE 2 DIABETES MELLITUS WITH HYPERGLYCEMIA, WITHOUT LONG-TERM CURRENT USE OF INSULIN (MULTI): ICD-10-CM

## 2024-03-14 DIAGNOSIS — Z00.00 ENCOUNTER FOR ROUTINE ADULT HEALTH EXAMINATION WITHOUT ABNORMAL FINDINGS: ICD-10-CM

## 2024-03-14 DIAGNOSIS — E11.40 TYPE 2 DIABETES MELLITUS WITH DIABETIC NEUROPATHY, WITHOUT LONG-TERM CURRENT USE OF INSULIN (MULTI): ICD-10-CM

## 2024-03-14 DIAGNOSIS — I10 PRIMARY HYPERTENSION: ICD-10-CM

## 2024-03-14 DIAGNOSIS — G25.81 RESTLESS LEGS: Primary | ICD-10-CM

## 2024-03-14 DIAGNOSIS — R14.0 BLOATING: ICD-10-CM

## 2024-03-14 PROCEDURE — 3078F DIAST BP <80 MM HG: CPT | Performed by: INTERNAL MEDICINE

## 2024-03-14 PROCEDURE — 83013 H PYLORI (C-13) BREATH: CPT

## 2024-03-14 PROCEDURE — 99214 OFFICE O/P EST MOD 30 MIN: CPT | Performed by: INTERNAL MEDICINE

## 2024-03-14 PROCEDURE — 3074F SYST BP LT 130 MM HG: CPT | Performed by: INTERNAL MEDICINE

## 2024-03-14 PROCEDURE — 3008F BODY MASS INDEX DOCD: CPT | Performed by: INTERNAL MEDICINE

## 2024-03-14 PROCEDURE — 3051F HG A1C>EQUAL 7.0%<8.0%: CPT | Performed by: INTERNAL MEDICINE

## 2024-03-14 PROCEDURE — 1036F TOBACCO NON-USER: CPT | Performed by: INTERNAL MEDICINE

## 2024-03-14 RX ORDER — LABETALOL 100 MG/1
1 TABLET, FILM COATED ORAL EVERY 12 HOURS
Qty: 180 TABLET | Refills: 3 | Status: SHIPPED | OUTPATIENT
Start: 2024-03-14

## 2024-03-14 ASSESSMENT — PAIN SCALES - GENERAL: PAINLEVEL: 0-NO PAIN

## 2024-03-14 ASSESSMENT — PATIENT HEALTH QUESTIONNAIRE - PHQ9
2. FEELING DOWN, DEPRESSED OR HOPELESS: NOT AT ALL
SUM OF ALL RESPONSES TO PHQ9 QUESTIONS 1 AND 2: 0
1. LITTLE INTEREST OR PLEASURE IN DOING THINGS: NOT AT ALL

## 2024-03-14 NOTE — PATIENT INSTRUCTIONS
Arti,   For abdominal pain/bloating   Consider stopping metformin to see if abdominal symptoms improve   Try simethicone (gas-x)   Increase fiber and water - 2 kiwis or 4 prunes a day is equivalent to metamucil   Gastroenterology referral - I recommend Dr. Isra Baca, Dr. Yolanda Galindo, Dr. Yash Last, Dr. Pat Chaidez or Dr. Aisha Jose.   Diabetes   Read The Obesity Code by Dr. Carlitos Slade and Atomic Habits by Jose A Kelly   Restless legs/iron deficiency   Test for H.pylori (breath test)   Continue iron tablets every day or every other day based on symptoms   Hematology referral in consideration for IV iron infusions.   Followup in September or as needed

## 2024-03-14 NOTE — ASSESSMENT & PLAN NOTE
Followed by Vickie Blevins on metformin, discontinued phentermine due to intolerance.   A1C remains uncontrolled with goal A1C <6.5% prior to initiation of fertility treatment.  Given GI concerns chronic seemingly in the setting of metformin, advised consideration for discontinuation at least temporarily to see if GI symptoms that have been longstanding improve.  She will discuss this in greater detail with endocrinology.  Remains hesitant for GLP-1 agonist, may consider SGLT2 inhibitor though this cannot be given in the setting of pregnancy.  If GI symptoms resolved, may reconsider GLP-1 agonist for the treatment of diabetes.  Will continue to work on lifestyle management, references provided.

## 2024-03-14 NOTE — PROGRESS NOTES
Subjective   Patient ID: Arti Macias is a 33 y.o. female who presents for Follow-up (6M FUV).  HPI  33-year-old female here for follow-up visit, last seen in September for preventive care visit    9/23: A1C 7.4% iron levels unchanged consider further workup.   10/23: seen by endo declined rybelsus trying to get to reproductive endo.   Seen by bhi  11/23: phentermine may be worsening her anxiety  12/23: ordered for celiac and h.pylori testing. Celiac negative     Since last being seen she discontinued her phentermine due to worsening anxiety and felt that it made her meaner. She was last seen   - JACKIE - chronic - previously on several medications discontinued in adulthood, provided BHI resources. Mainly job related unable to deal with the stress at work that she matilde by eating at home, referred to behavioral health at last visit previously seen by Juan J and was referred to alterante therapist but has had trouble schedulign with current one, plans to engage in private therapist that her sister referred her to.   - Since discontinuing her phentermine she noted worsening abdominal discomfort with air and bloating that is consistent, unrelated to food, worse when menstruating, some improvement in symptoms when eating. The pain is located in the bottom part of her abdomen diffusely described as a bloating and achy sensation. She notes worsening constipation that is normal caliber of stool every other day, last week had experienced diarrhea. Has noted weight gain since stopping phentermine.  She notes that she has chronic longstanding diarrhea since initiation of metformin at the age of 17.  This has recently improved after she started to take iron supplements.  Last week, for unknown reason she had experienced a period of diarrhea that has since resolved.  The constipation and bloating has resumed.  Denies hematochezia or melena, nausea vomiting, constitutional symptoms.  -Longstanding restless leg  "syndrome-describes uncontrollable need to move her feet at night and improves with movement.  She has been taking iron tablets without significant benefit.    PMHx:  - Iron deficiency - has been taking an iron supplement every other days, feels less tired than she used to be.    -Class 3 obesity - finds phentermine to have improved energy, eats very little until dinnertime. Has not lost any additional weight after she achieved weight loss to 290.   - HTN  - on labetolol trying to conceive has an appointment with Dr. Palma at [facility].   - DM2 with neuropathy A1C 7.6% diagnosed at 16, type 2. Takes metformin 1000mg BID followed by endocrinology   - Gallstones for the past 10 years. not an issue for her last flare over 20 years ago. Gets intermittent flares with Chik-Andrey-A   - Sleep disorder referred to sleep medicine unable to afford to be seen.   - Lower extremity edema thought secondary to venous insufficiency      Social:   - Lives at home with ,  at [facility]   - no children, goal is for her to have a child   -No tobacco alcohol or illicits     Current Outpatient Medications   Medication Instructions    ferrous sulfate 25 mg, oral, Daily with breakfast, Do not crush, chew, or split.<BR>Per patient taking 25mg per day    labetalol (NORMODYNE) 100 mg, oral, Every 12 hours    metFORMIN (GLUCOPHAGE) 1,000 mg, oral, 2 times daily with meals        Objective     /76   Pulse 98   Temp 36.6 °C (97.9 °F) (Temporal)   Ht 1.651 m (5' 5\")   Wt 140 kg (308 lb)   LMP 03/13/2024   SpO2 97%   BMI 51.25 kg/m²     Physical Exam  General: Appears comfortable, NAD, appropriate affect  HEENT: NCAT, EOMI, pupils symmetric, no conjunctival erythema   Neck: Supple, no LAD   Heart: RRR S1 S2 no murmurs appreciated   Lungs: CTA bilaterally, no rhonchi, rales, or wheezes   Abdomen: Soft, NT/ND, no rebound or guarding  Extremities: no cyanosis or edema appreciated  Neuro: AAO x 3, answers questions " appropriately, no FND, gait unremarkable      Assessment/Plan   Problem List Items Addressed This Visit      Assessment/Plan   Problem List Items Addressed This Visit       Hypertension    Current Assessment & Plan     Controlled on labetalol, actively trying to conceive.         Relevant Medications    labetalol (Normodyne) 100 mg tablet    Type 2 diabetes mellitus with hyperglycemia, without long-term current use of insulin (CMS/[facility])    Current Assessment & Plan     Followed by Vickie Blevins on metformin, discontinued phentermine due to intolerance.   A1C remains uncontrolled with goal A1C <6.5% prior to initiation of fertility treatment.  Given GI concerns chronic seemingly in the setting of metformin, advised consideration for discontinuation at least temporarily to see if GI symptoms that have been longstanding improve.  She will discuss this in greater detail with endocrinology.  Remains hesitant for GLP-1 agonist, may consider SGLT2 inhibitor though this cannot be given in the setting of pregnancy.  If GI symptoms resolved, may reconsider GLP-1 agonist for the treatment of diabetes.  Will continue to work on lifestyle management, references provided.  Sample CGM provided as well.          Other Visit Diagnoses       Restless legs    -  Primary  With persistent iron deficiency despite oral iron administration and likely intolerance.  Will refer to hematology and consideration for IV iron infusions.    Relevant Orders    Referral to Benign Hematology    Iron deficiency        Relevant Orders    Referral to Benign Hematology    Bloating        Relevant Orders    Referral to Gastroenterology    Encounter for routine adult health examination without abnormal findings        Relevant Orders    Follow Up In [facility] - PCP - Health Maintenance    Type 2 diabetes mellitus with diabetic neuropathy, without long-term current use of insulin (CMS/[facility])              Follow-up for preventive care visit at next  visit in September.

## 2024-03-15 LAB — UREA BREATH TEST QL: NEGATIVE

## 2024-03-16 DIAGNOSIS — E11.65 TYPE 2 DIABETES MELLITUS WITH HYPERGLYCEMIA (MULTI): ICD-10-CM

## 2024-03-20 RX ORDER — METFORMIN HYDROCHLORIDE 500 MG/1
1000 TABLET, EXTENDED RELEASE ORAL
Qty: 120 TABLET | Refills: 11 | Status: SHIPPED | OUTPATIENT
Start: 2024-03-20 | End: 2024-05-10 | Stop reason: WASHOUT

## 2024-05-10 ENCOUNTER — APPOINTMENT (OUTPATIENT)
Dept: GASTROENTEROLOGY | Facility: CLINIC | Age: 34
End: 2024-05-10
Payer: COMMERCIAL

## 2024-05-10 ENCOUNTER — OFFICE VISIT (OUTPATIENT)
Dept: GASTROENTEROLOGY | Facility: CLINIC | Age: 34
End: 2024-05-10
Payer: COMMERCIAL

## 2024-05-10 VITALS
SYSTOLIC BLOOD PRESSURE: 138 MMHG | HEART RATE: 96 BPM | DIASTOLIC BLOOD PRESSURE: 84 MMHG | TEMPERATURE: 97.9 F | WEIGHT: 293 LBS | BODY MASS INDEX: 48.82 KG/M2 | HEIGHT: 65 IN

## 2024-05-10 DIAGNOSIS — K58.0 IRRITABLE BOWEL SYNDROME WITH DIARRHEA: Primary | ICD-10-CM

## 2024-05-10 DIAGNOSIS — D50.9 IRON DEFICIENCY ANEMIA, UNSPECIFIED IRON DEFICIENCY ANEMIA TYPE: ICD-10-CM

## 2024-05-10 DIAGNOSIS — R14.0 BLOATING: ICD-10-CM

## 2024-05-10 PROCEDURE — 3079F DIAST BP 80-89 MM HG: CPT | Performed by: NURSE PRACTITIONER

## 2024-05-10 PROCEDURE — 99214 OFFICE O/P EST MOD 30 MIN: CPT | Performed by: NURSE PRACTITIONER

## 2024-05-10 PROCEDURE — 99204 OFFICE O/P NEW MOD 45 MIN: CPT | Performed by: NURSE PRACTITIONER

## 2024-05-10 PROCEDURE — 3008F BODY MASS INDEX DOCD: CPT | Performed by: NURSE PRACTITIONER

## 2024-05-10 PROCEDURE — 3075F SYST BP GE 130 - 139MM HG: CPT | Performed by: NURSE PRACTITIONER

## 2024-05-10 PROCEDURE — 3051F HG A1C>EQUAL 7.0%<8.0%: CPT | Performed by: NURSE PRACTITIONER

## 2024-05-10 ASSESSMENT — PAIN SCALES - GENERAL: PAINLEVEL: 0-NO PAIN

## 2024-05-10 ASSESSMENT — ENCOUNTER SYMPTOMS
CARDIOVASCULAR NEGATIVE: 1
NEUROLOGICAL NEGATIVE: 1
ENDOCRINE NEGATIVE: 1
EYES NEGATIVE: 1
ALLERGIC/IMMUNOLOGIC NEGATIVE: 1
MUSCULOSKELETAL NEGATIVE: 1
HEMATOLOGIC/LYMPHATIC NEGATIVE: 1
RESPIRATORY NEGATIVE: 1
ABDOMINAL PAIN: 1
CONSTITUTIONAL NEGATIVE: 1
PSYCHIATRIC NEGATIVE: 1

## 2024-05-10 NOTE — PATIENT INSTRUCTIONS
Iron deficency anemia- I will order labs for cbc, iron, ferritin, folate and B12. If you are still anemic I would recommend evaluation of your upper and lower GI tract for causes of the anemia.    Irritable bowel syndrome with diarrhea vs small intestinal bacterial overgrowth- add a fiber supplement daily such sugar free metamucil - one tablespoon if powder and 2 tablets if you chose this version. You will want to take it at a different time of the day than your iron tablet. I will order a hydrogen breath test for small intestinal bacterial overgrowth    I will call you with your results and determine follow up

## 2024-05-10 NOTE — PROGRESS NOTES
Subjective   Patient ID: Arti Macias is a 33 y.o. female who presents for No chief complaint on file..  HPI  33-year-old female for new patient visit for evaluation of stomach issues  Medical history includes hypertension, diabetes type 2, restless leg syndrome, cholelithiasis  Labs reviewed  3/14/2024 negative urea breath test  Negative celiac panel  Hemoglobin A1c 7.9%  15 years of stomach problems  Back hurt at times- intermitent  Has had diarrhea her whole life  Iron was low  And then stools got better  Was on phentermine and stomach started acting up   Was gurgling  One iron every other day and then gets intermittent diarrhea  Usually goes multiple times per day  Never had a colonoscopy\  FHX; dad polyps   No colon cancer or IBD  Fiber- no supplement  Loves fruits, eats some vegetables  Some form with the iron  Water- not much  Exercise- not much, was going to a  and stopped  No heavy menses      Review of Systems   Constitutional: Negative.    HENT: Negative.     Eyes: Negative.    Respiratory: Negative.     Cardiovascular: Negative.    Gastrointestinal:  Positive for abdominal pain.   Endocrine: Negative.    Genitourinary: Negative.    Musculoskeletal: Negative.    Skin: Negative.    Allergic/Immunologic: Negative.    Neurological: Negative.    Hematological: Negative.    Psychiatric/Behavioral: Negative.         Objective   Physical Exam  Constitutional:       Appearance: Normal appearance. She is obese.   HENT:      Head: Normocephalic.      Nose: Nose normal.      Mouth/Throat:      Mouth: Mucous membranes are moist.   Eyes:      Pupils: Pupils are equal, round, and reactive to light.   Cardiovascular:      Rate and Rhythm: Normal rate and regular rhythm.      Pulses: Normal pulses.      Heart sounds: Normal heart sounds.   Pulmonary:      Effort: Pulmonary effort is normal.      Breath sounds: Normal breath sounds.   Abdominal:      General: Bowel sounds are normal.      Palpations: Abdomen  is soft.   Musculoskeletal:         General: Normal range of motion.      Cervical back: Normal range of motion.   Skin:     General: Skin is warm and dry.   Neurological:      Mental Status: She is alert.   Psychiatric:         Mood and Affect: Mood normal.         Assessment/Plan        Iron deficency anemia- I will order labs for cbc, iron, ferritin, folate and B12. If you are still anemic I would recommend evaluation of your upper and lower GI tract for causes of the anemia.    Irritable bowel syndrome with diarrhea vs small intestinal bacterial overgrowth- add a fiber supplement daily such sugar free metamucil - one tablespoon if powder and 2 tablets if you chose this version. You will want to take it at a different time of the day than your iron tablet. I will order a hydrogen breath test for small intestinal bacterial overgrowth    I will call you with your results and determine follow up      CONNOR Balderas 05/10/24 3:06 PM

## 2024-05-10 NOTE — LETTER
May 10, 2024       No Recipients    Patient: rAti Macias   YOB: 1990   Date of Visit: 5/10/2024       Dear Dr. Rocha Recipients:    Thank you for referring Arti Macias to me for evaluation. Below are my notes for this consultation.  If you have questions, please do not hesitate to call me. I look forward to following your patient along with you.       Sincerely,     Bina Hicks, APRN-CNP      CC:   No Recipients  ______________________________________________________________________________________    Subjective  Patient ID: Arti Macias is a 33 y.o. female who presents for No chief complaint on file..  HPI  33-year-old female for new patient visit for evaluation of stomach issues  Medical history includes hypertension, diabetes type 2, restless leg syndrome, cholelithiasis  Labs reviewed  3/14/2024 negative urea breath test  Negative celiac panel  Hemoglobin A1c 7.9%  15 years of stomach problems  Back hurt at times- intermitent  Has had diarrhea her whole life  Iron was low  And then stools got better  Was on phentermine and stomach started acting up   Was gurgling  One iron every other day and then gets intermittent diarrhea  Usually goes multiple times per day  Never had a colonoscopy\  FHX; dad polyps   No colon cancer or IBD  Fiber- no supplement  Loves fruits, eats some vegetables  Some form with the iron  Water- not much  Exercise- not much, was going to a  and stopped  No heavy menses      Review of Systems   Constitutional: Negative.    HENT: Negative.     Eyes: Negative.    Respiratory: Negative.     Cardiovascular: Negative.    Gastrointestinal:  Positive for abdominal pain.   Endocrine: Negative.    Genitourinary: Negative.    Musculoskeletal: Negative.    Skin: Negative.    Allergic/Immunologic: Negative.    Neurological: Negative.    Hematological: Negative.    Psychiatric/Behavioral: Negative.         Objective  Physical Exam  Constitutional:        Appearance: Normal appearance. She is obese.   HENT:      Head: Normocephalic.      Nose: Nose normal.      Mouth/Throat:      Mouth: Mucous membranes are moist.   Eyes:      Pupils: Pupils are equal, round, and reactive to light.   Cardiovascular:      Rate and Rhythm: Normal rate and regular rhythm.      Pulses: Normal pulses.      Heart sounds: Normal heart sounds.   Pulmonary:      Effort: Pulmonary effort is normal.      Breath sounds: Normal breath sounds.   Abdominal:      General: Bowel sounds are normal.      Palpations: Abdomen is soft.   Musculoskeletal:         General: Normal range of motion.      Cervical back: Normal range of motion.   Skin:     General: Skin is warm and dry.   Neurological:      Mental Status: She is alert.   Psychiatric:         Mood and Affect: Mood normal.         Assessment/Plan  {Assess/PlanSmartLinks:49683}     Iron deficency anemia- I will order labs for cbc, iron, ferritin, folate and B12. If you are still anemic I would recommend evaluation of your upper and lower GI tract for causes of the anemia.    Irritable bowel syndrome with diarrhea vs small intestinal bacterial overgrowth- add a fiber supplement daily such sugar free metamucil - one tablespoon if powder and 2 tablets if you chose this version. You will want to take it at a different time of the day than your iron tablet. I will order a hydrogen breath test for small intestinal bacterial overgrowth    I will call you with your results and determine follow up      CONNOR Balderas 05/10/24 3:06 PM

## 2024-05-31 NOTE — PROGRESS NOTES
Subjective   Patient ID:   Arti Macias is a 33 y.o. female who presents for No chief complaint on file..  HPI  New patient here today to establish care with myself.  Previous PCP: Dr. Araujo  Last seen:  Has 2 upcoming appointments with previous PCP.    HTN:  Taking Labetalol.  BP today is  Denies dizziness, headaches.    Diabetes:  Taking Metformin.  Last A1C was 7.9 in Feb 2024.  DUE for A1C.    Iron deficiency anemia:  Taking an iron supplement.  Last checked June 2023.  DUE for labs.    Health maintenance:  Smoking:  Labs: June 2023. DUE  Influenza:    Review of Systems  12 point review of systems negative unless stated above in HPI    There were no vitals filed for this visit.    Physical Exam  General: Alert and oriented, well nourished, no acute distress.  Lungs: Clear to auscultation, non-labored respiration.  Heart: Normal rate, regular rhythm, no murmur, gallop or edema.  Neurologic: Awake, alert, and oriented X3, CN II-XII intact.  Psychiatric: Cooperative, appropriate mood and affect.    Assessment/Plan   Diagnoses and all orders for this visit:  Type 2 diabetes mellitus with hyperglycemia, without long-term current use of insulin (Multi)  Primary hypertension  Iron deficiency anemia, unspecified iron deficiency anemia type

## 2024-06-01 ENCOUNTER — PATIENT MESSAGE (OUTPATIENT)
Dept: ENDOCRINOLOGY | Facility: CLINIC | Age: 34
End: 2024-06-01
Payer: COMMERCIAL

## 2024-06-01 DIAGNOSIS — E11.65 TYPE 2 DIABETES MELLITUS WITH HYPERGLYCEMIA, WITHOUT LONG-TERM CURRENT USE OF INSULIN (MULTI): Primary | ICD-10-CM

## 2024-06-03 ENCOUNTER — APPOINTMENT (OUTPATIENT)
Dept: PRIMARY CARE | Facility: CLINIC | Age: 34
End: 2024-06-03
Payer: COMMERCIAL

## 2024-06-03 ENCOUNTER — LAB (OUTPATIENT)
Dept: LAB | Facility: LAB | Age: 34
End: 2024-06-03
Payer: COMMERCIAL

## 2024-06-03 DIAGNOSIS — D50.9 IRON DEFICIENCY ANEMIA, UNSPECIFIED IRON DEFICIENCY ANEMIA TYPE: ICD-10-CM

## 2024-06-03 DIAGNOSIS — I10 PRIMARY HYPERTENSION: ICD-10-CM

## 2024-06-03 DIAGNOSIS — E11.65 TYPE 2 DIABETES MELLITUS WITH HYPERGLYCEMIA, WITHOUT LONG-TERM CURRENT USE OF INSULIN (MULTI): ICD-10-CM

## 2024-06-03 DIAGNOSIS — E11.65 TYPE 2 DIABETES MELLITUS WITH HYPERGLYCEMIA, WITHOUT LONG-TERM CURRENT USE OF INSULIN (MULTI): Primary | ICD-10-CM

## 2024-06-03 LAB
ALBUMIN SERPL BCP-MCNC: 3.8 G/DL (ref 3.4–5)
ALP SERPL-CCNC: 38 U/L (ref 33–110)
ALT SERPL W P-5'-P-CCNC: 14 U/L (ref 7–45)
ANION GAP SERPL CALC-SCNC: 15 MMOL/L (ref 10–20)
AST SERPL W P-5'-P-CCNC: 11 U/L (ref 9–39)
BILIRUB SERPL-MCNC: 0.2 MG/DL (ref 0–1.2)
BUN SERPL-MCNC: 10 MG/DL (ref 6–23)
CALCIUM SERPL-MCNC: 9.1 MG/DL (ref 8.6–10.6)
CHLORIDE SERPL-SCNC: 101 MMOL/L (ref 98–107)
CHOLEST SERPL-MCNC: 197 MG/DL (ref 0–199)
CHOLESTEROL/HDL RATIO: 4.8
CO2 SERPL-SCNC: 26 MMOL/L (ref 21–32)
CREAT SERPL-MCNC: 0.71 MG/DL (ref 0.5–1.05)
EGFRCR SERPLBLD CKD-EPI 2021: >90 ML/MIN/1.73M*2
ERYTHROCYTE [DISTWIDTH] IN BLOOD BY AUTOMATED COUNT: 14.3 % (ref 11.5–14.5)
EST. AVERAGE GLUCOSE BLD GHB EST-MCNC: 194 MG/DL
FERRITIN SERPL-MCNC: 39 NG/ML (ref 8–150)
FOLATE SERPL-MCNC: 11.1 NG/ML
GLUCOSE SERPL-MCNC: 196 MG/DL (ref 74–99)
HBA1C MFR BLD: 8.4 %
HCT VFR BLD AUTO: 39.6 % (ref 36–46)
HDLC SERPL-MCNC: 41 MG/DL
HGB BLD-MCNC: 12.1 G/DL (ref 12–16)
IRON SATN MFR SERPL: 11 % (ref 25–45)
IRON SERPL-MCNC: 39 UG/DL (ref 35–150)
LDLC SERPL CALC-MCNC: 100 MG/DL
MCH RBC QN AUTO: 24.9 PG (ref 26–34)
MCHC RBC AUTO-ENTMCNC: 30.6 G/DL (ref 32–36)
MCV RBC AUTO: 82 FL (ref 80–100)
NON HDL CHOLESTEROL: 156 MG/DL (ref 0–149)
NRBC BLD-RTO: 0 /100 WBCS (ref 0–0)
PLATELET # BLD AUTO: 300 X10*3/UL (ref 150–450)
POTASSIUM SERPL-SCNC: 4.5 MMOL/L (ref 3.5–5.3)
PROT SERPL-MCNC: 6.4 G/DL (ref 6.4–8.2)
RBC # BLD AUTO: 4.86 X10*6/UL (ref 4–5.2)
SODIUM SERPL-SCNC: 137 MMOL/L (ref 136–145)
TIBC SERPL-MCNC: 345 UG/DL (ref 240–445)
TRIGL SERPL-MCNC: 280 MG/DL (ref 0–149)
TSH SERPL-ACNC: 1.32 MIU/L (ref 0.44–3.98)
UIBC SERPL-MCNC: 306 UG/DL (ref 110–370)
VIT B12 SERPL-MCNC: 222 PG/ML (ref 211–911)
VLDL: 56 MG/DL (ref 0–40)
WBC # BLD AUTO: 8.4 X10*3/UL (ref 4.4–11.3)

## 2024-06-03 PROCEDURE — 80061 LIPID PANEL: CPT

## 2024-06-03 PROCEDURE — 82746 ASSAY OF FOLIC ACID SERUM: CPT

## 2024-06-03 PROCEDURE — 82728 ASSAY OF FERRITIN: CPT

## 2024-06-03 PROCEDURE — 80053 COMPREHEN METABOLIC PANEL: CPT

## 2024-06-03 PROCEDURE — 83550 IRON BINDING TEST: CPT

## 2024-06-03 PROCEDURE — 83036 HEMOGLOBIN GLYCOSYLATED A1C: CPT

## 2024-06-03 PROCEDURE — 83540 ASSAY OF IRON: CPT

## 2024-06-03 PROCEDURE — 85027 COMPLETE CBC AUTOMATED: CPT

## 2024-06-03 PROCEDURE — 36415 COLL VENOUS BLD VENIPUNCTURE: CPT

## 2024-06-03 PROCEDURE — 84443 ASSAY THYROID STIM HORMONE: CPT

## 2024-06-03 PROCEDURE — 82607 VITAMIN B-12: CPT

## 2024-06-03 RX ORDER — METFORMIN HYDROCHLORIDE 500 MG/1
1000 TABLET, EXTENDED RELEASE ORAL
Qty: 360 TABLET | Refills: 3 | Status: SHIPPED | OUTPATIENT
Start: 2024-06-03 | End: 2025-06-03

## 2024-06-04 ENCOUNTER — OFFICE VISIT (OUTPATIENT)
Dept: ENDOCRINOLOGY | Facility: CLINIC | Age: 34
End: 2024-06-04
Payer: COMMERCIAL

## 2024-06-04 VITALS
SYSTOLIC BLOOD PRESSURE: 141 MMHG | HEART RATE: 100 BPM | DIASTOLIC BLOOD PRESSURE: 94 MMHG | BODY MASS INDEX: 48.82 KG/M2 | HEIGHT: 65 IN | WEIGHT: 293 LBS

## 2024-06-04 DIAGNOSIS — E11.65 TYPE 2 DIABETES MELLITUS WITH HYPERGLYCEMIA, WITHOUT LONG-TERM CURRENT USE OF INSULIN (MULTI): ICD-10-CM

## 2024-06-04 DIAGNOSIS — D50.9 IRON DEFICIENCY ANEMIA, UNSPECIFIED IRON DEFICIENCY ANEMIA TYPE: Primary | ICD-10-CM

## 2024-06-04 PROCEDURE — 3049F LDL-C 100-129 MG/DL: CPT | Performed by: NURSE PRACTITIONER

## 2024-06-04 PROCEDURE — 1036F TOBACCO NON-USER: CPT | Performed by: NURSE PRACTITIONER

## 2024-06-04 PROCEDURE — 3008F BODY MASS INDEX DOCD: CPT | Performed by: NURSE PRACTITIONER

## 2024-06-04 PROCEDURE — 3052F HG A1C>EQUAL 8.0%<EQUAL 9.0%: CPT | Performed by: NURSE PRACTITIONER

## 2024-06-04 PROCEDURE — 99214 OFFICE O/P EST MOD 30 MIN: CPT | Performed by: NURSE PRACTITIONER

## 2024-06-04 PROCEDURE — 3077F SYST BP >= 140 MM HG: CPT | Performed by: NURSE PRACTITIONER

## 2024-06-04 PROCEDURE — 3080F DIAST BP >= 90 MM HG: CPT | Performed by: NURSE PRACTITIONER

## 2024-06-04 RX ORDER — POLYETHYLENE GLYCOL-3350 AND ELECTROLYTES 236; 6.74; 5.86; 2.97; 22.74 G/274.31G; G/274.31G; G/274.31G; G/274.31G; G/274.31G
4000 POWDER, FOR SOLUTION ORAL ONCE
Qty: 4000 ML | Refills: 0 | Status: SHIPPED | OUTPATIENT
Start: 2024-06-04 | End: 2024-06-04

## 2024-06-04 NOTE — Clinical Note
I think she may need dose adjustment of BP med.  She is still unwilling to do GLP1.  I can't motivate her.

## 2024-06-04 NOTE — PROGRESS NOTES
"  Subjective   Arti Macias is a 33 y.o. female presents today for a follow up of DM Type 2. Initial diagnosis with diabetes was 12-15 year ago. (+) family history of diabetes in her dad.    Known complications include: obesity, HTN     Previously seeing Dr. Dowell through [facility].   Last visit with me 6/2023  A1c 8.4% yesterday.  Previous A1c 7.9% in 2/2024 and  7.4% 10/2/2023.   She has been on metformin since diagnosis  She also has struggled with weight her whole life   Highest weight 355-360 lbs.     Since last visit, she stopped phentermine in 12/2023 due to mood and anxiety   She is disappointed because she likes the energy it brings to her   It has also helped with her weight loss    She saw reproductive endo in Feb.    The recommendation was A1c less than 6.5% and weight < 50 BMI  She was encouraged to use GLP1 up until + pregnancy tst.        Highest weight 350 lbs 3-5 years ago   Starting weight 313 lbs in March 2023  Weight in June 2023 293 lbs  Today 316lbs.      Current diabetes regimen is as follows:   Metformin  mg twice daily      The patient is not currently checking the blood glucose     Hypoglycemia frequency: Denies  Hypoglycemia awareness: Yes     Regarding symptoms of hyperglycemia, the patient is not experiencing any symptoms such as polyuria, polydipsia, nocturia or rapid weight loss or blurry vision. The patient comes into the office today with wanting to lose more weight.        ROS  General: no fever, chills or acute changes in weight in the last 6 months  Skin: no rashes, pruritis or dry skin  Cardiac: denies chest pain, heart palpitations or orthopnea  Pulmonary: denies wheezing, productive cough or exertional dyspnea      Objective    Physical Exam  Blood pressure (!) 141/94, pulse 100, height 1.651 m (5' 5\"), weight 143 kg (316 lb).  General: not in acute distress, cooperative   Respiratory: normal respiratory effort  Musculoskeletal: normal gait       Current Outpatient " Medications:     ferrous sulfate 325 (65 Fe) MG EC tablet, Take 25 mg by mouth once daily with breakfast. Do not crush, chew, or split. Per patient taking 25mg per day, Disp: , Rfl:     labetalol (Normodyne) 100 mg tablet, Take 1 tablet (100 mg) by mouth every 12 hours., Disp: 180 tablet, Rfl: 3    metFORMIN XR (Glucophage-XR) 500 mg 24 hr tablet, Take 2 tablets (1,000 mg) by mouth 2 times daily (morning and late afternoon). Do not crush, chew, or split., Disp: 360 tablet, Rfl: 3    Assessment/Plan   Obesity BMI >40 :   Type 2 diabetes with hyperglycemia without long term use insulin:   - A1c not at goal, up from previous.  She would like to continue metformin only.  She is not willing to start GLP1 oral or injection.  She feels like she does not want to waste our time and would like to follow up in 6 months.  She is not sure she wants to do fertility.  She still worries about side effects with the medications.  Her  is trying to lose weight as well and trying to motivate her to walk. She has not done yet.  She likes riding her bike but using electric bike.        Plan:   Continue metformin   Let me know if you change your mind about once weekly injection or Rybelsus   Follow up in 6 months      Hyperlipidemia:   - LDL not at goal for diabetes.  Discussed increased CV risk with elevated Ldl.  Not on meds and does not want to start.      Plan:   Continue diet and exercise if unwilling to start statin.      HTN:   BP not at goal.  Will send message to PCP    Plan:  May need increase of labetalol  Discuss with Dr. Araujo at upcoming appt

## 2024-06-04 NOTE — PATIENT INSTRUCTIONS
Continue metformin     Let me know if you change your mind about once weekly injection or Rybelsus     Follow up in 6 months

## 2024-06-06 ENCOUNTER — OFFICE VISIT (OUTPATIENT)
Dept: PRIMARY CARE | Facility: CLINIC | Age: 34
End: 2024-06-06
Payer: COMMERCIAL

## 2024-06-06 VITALS
DIASTOLIC BLOOD PRESSURE: 83 MMHG | BODY MASS INDEX: 52.59 KG/M2 | SYSTOLIC BLOOD PRESSURE: 134 MMHG | WEIGHT: 293 LBS | HEART RATE: 85 BPM | OXYGEN SATURATION: 96 %

## 2024-06-06 DIAGNOSIS — I10 PRIMARY HYPERTENSION: ICD-10-CM

## 2024-06-06 DIAGNOSIS — F41.9 ANXIETY: ICD-10-CM

## 2024-06-06 DIAGNOSIS — S16.1XXD STRAIN OF NECK MUSCLE, SUBSEQUENT ENCOUNTER: Primary | ICD-10-CM

## 2024-06-06 DIAGNOSIS — E11.65 TYPE 2 DIABETES MELLITUS WITH HYPERGLYCEMIA, WITHOUT LONG-TERM CURRENT USE OF INSULIN (MULTI): ICD-10-CM

## 2024-06-06 PROBLEM — E61.1 IRON DEFICIENCY: Status: ACTIVE | Noted: 2024-06-06

## 2024-06-06 PROBLEM — J30.9 ALLERGIC RHINITIS: Status: RESOLVED | Noted: 2023-02-18 | Resolved: 2024-06-06

## 2024-06-06 PROCEDURE — 3049F LDL-C 100-129 MG/DL: CPT | Performed by: INTERNAL MEDICINE

## 2024-06-06 PROCEDURE — 3052F HG A1C>EQUAL 8.0%<EQUAL 9.0%: CPT | Performed by: INTERNAL MEDICINE

## 2024-06-06 PROCEDURE — 3008F BODY MASS INDEX DOCD: CPT | Performed by: INTERNAL MEDICINE

## 2024-06-06 PROCEDURE — 3075F SYST BP GE 130 - 139MM HG: CPT | Performed by: INTERNAL MEDICINE

## 2024-06-06 PROCEDURE — 99213 OFFICE O/P EST LOW 20 MIN: CPT | Performed by: INTERNAL MEDICINE

## 2024-06-06 PROCEDURE — 1036F TOBACCO NON-USER: CPT | Performed by: INTERNAL MEDICINE

## 2024-06-06 PROCEDURE — 3079F DIAST BP 80-89 MM HG: CPT | Performed by: INTERNAL MEDICINE

## 2024-06-06 RX ORDER — MELOXICAM 15 MG/1
15 TABLET ORAL DAILY PRN
Qty: 30 TABLET | Refills: 0 | Status: SHIPPED | OUTPATIENT
Start: 2024-06-06 | End: 2025-06-06

## 2024-06-06 ASSESSMENT — PAIN SCALES - GENERAL: PAINLEVEL: 2

## 2024-06-06 NOTE — ASSESSMENT & PLAN NOTE
Followed by Vickie Blevins on metformin, discontinued phentermine due to intolerance.   A1C remains uncontrolled with goal A1C <6.5% prior to initiation of fertility treatment.  Engaged in detailed discussion regarding long-term risks of uncontrolled diabetes and metabolic issues and at present using current available medications to treat this condition faraway the potential risks.  She remains precontemplative and will let me know.

## 2024-06-06 NOTE — PROGRESS NOTES
"Subjective   Patient ID: Arti Macias is a 33 y.o. female who presents for Neck Pain (/).  HPI  33-year-old female here to concern for ringing in her ear with pain, last seen in March.    3/24: hpylori negative  5/24 endocrine declined additional management, blood pressure not at goal, LDL not at goal follow-up 6 months    Experiencing right posterior neck pain described as a tightness sensation \"like a rubber band\". The pain radiates to the area where she injured herself and has shoulder pain on that side. [facility]   One month ago hit her head on a glass globe while standing up when cleaning. She notes a residual feeling of head at the spot of injury        PMHx:  - JACKIE - chronic - previously on several medications discontinued in adulthood, provided BHI resources. Mainly job related unable to deal with the stress at work that she matilde by eating at home, referred to behavioral health at last visit previously seen by Juan J and was referred to alternate therapist but has had trouble scheduling with current one, plans to engage in private therapist that her sister referred her to.   - RLS/ Iron deficiency - has been taking an iron supplement every other days, feels less tired than she used to be. Seen by Bina inman recommend EGD and colonoscopy    -Class 3 obesity -  previously on phentermine  - HTN  - on labetolol trying to conceive has an appointment with Dr. Palma at [facility].   - DM2 with neuropathy A1C 7.6% diagnosed at 16, type 2. Takes metformin 1000mg BID followed by endocrinology   - Gallstones for the past 10 years. not an issue for her last flare over 20 years ago. Gets intermittent flares with Chik-Andrey-A   - Sleep disorder referred to sleep medicine unable to afford to be seen.   - Lower extremity edema thought secondary to venous insufficiency      Social:   - Lives at home with ,  at [facility]   - no children, goal is for her to have a " child   Current Outpatient Medications   Medication Instructions    ferrous sulfate 25 mg, oral, Daily with breakfast, Do not crush, chew, or split.<BR>Per patient taking 25mg per day    labetalol (NORMODYNE) 100 mg, oral, Every 12 hours    meloxicam (MOBIC) 15 mg, oral, Daily PRN    metFORMIN XR (GLUCOPHAGE-XR) 1,000 mg, oral, 2 times daily (morning and late afternoon), Do not crush, chew, or split.        Objective     /83   Pulse 85   Wt 143 kg (316 lb)   SpO2 96%   BMI 52.59 kg/m²     Physical Exam  General: Alert and oriented, in no apparent distress   HEENT: No conjunctival erythema, no external facial lesions   Lungs: Breathing comfortably  Skin: No evidence of skin breakdown.  Neuro: AAO x 3, answering questions appropriately, no obvious cranial nerve deficits   Neck: No reproducible spinous process tenderness on palpation, no overlying skin changes, reproducible tenderness in the right paraspinal muscles, with reproducibility on flexion and extension and sidebending.  No meningeal signs, full strength bilateral upper arms, no sensory deficits  Assessment/Plan   Problem List Items Addressed This Visit       Anxiety     Pending to be seen by primary therapist, will follow-up         Hypertension     On labetalol recommended home blood pressure monitoring and will uptitrate if necessary.         Type 2 diabetes mellitus with hyperglycemia, without long-term current use of insulin (Multi)     Followed by Vickie Blevins on metformin, discontinued phentermine due to intolerance.   A1C remains uncontrolled with goal A1C <6.5% prior to initiation of fertility treatment.  Engaged in detailed discussion regarding long-term risks of uncontrolled diabetes and metabolic issues and at present using current available medications to treat this condition faraway the potential risks.  She remains precontemplative and will let me know.          Other Visit Diagnoses       Strain of neck muscle, subsequent encounter     -  Primary  No concerning abnormalities appreciated on exam.   Advised supportive measures for now, will notify me if fails to improve or worsen.    Relevant Medications    meloxicam (Mobic) 15 mg tablet        Follow-up as scheduled

## 2024-06-06 NOTE — PATIENT INSTRUCTIONS
Arti  I recommend you monitor your home blood pressure readings. To do this, be sure that the blood pressure cuff is on bare skin. Feet should be planted on the floor and back should be supported. Arm should be resting at the level of the heart. No talking to anyone during measurement and no caffeine within 30 minutes of checking blood pressure. Goal should be <130/80 on AVERAGE (outliers do not count).   Consider colonoscopy/endoscopy   Really really consider treating the diabetes better.

## 2024-06-06 NOTE — ASSESSMENT & PLAN NOTE
Seen by GI and consideration for EGD and colonoscopy, she remains precontemplative.  Upcoming appointment scheduled with hematology and consideration for IV iron infusions.  This is complicating her restless leg syndrome given for prescription of oral iron.  She was also ordered SIBO workup

## 2024-06-10 ENCOUNTER — OFFICE VISIT (OUTPATIENT)
Dept: HEMATOLOGY/ONCOLOGY | Facility: CLINIC | Age: 34
End: 2024-06-10
Payer: COMMERCIAL

## 2024-06-10 VITALS
RESPIRATION RATE: 18 BRPM | BODY MASS INDEX: 48.82 KG/M2 | HEART RATE: 99 BPM | SYSTOLIC BLOOD PRESSURE: 136 MMHG | TEMPERATURE: 97.5 F | WEIGHT: 293 LBS | OXYGEN SATURATION: 98 % | DIASTOLIC BLOOD PRESSURE: 98 MMHG | HEIGHT: 65 IN

## 2024-06-10 DIAGNOSIS — R53.83 FATIGUE, UNSPECIFIED TYPE: Primary | ICD-10-CM

## 2024-06-10 DIAGNOSIS — G25.81 RESTLESS LEGS: ICD-10-CM

## 2024-06-10 DIAGNOSIS — E61.1 IRON DEFICIENCY: ICD-10-CM

## 2024-06-10 PROCEDURE — 3075F SYST BP GE 130 - 139MM HG: CPT | Performed by: NURSE PRACTITIONER

## 2024-06-10 PROCEDURE — 3049F LDL-C 100-129 MG/DL: CPT | Performed by: NURSE PRACTITIONER

## 2024-06-10 PROCEDURE — 3008F BODY MASS INDEX DOCD: CPT | Performed by: NURSE PRACTITIONER

## 2024-06-10 PROCEDURE — 1036F TOBACCO NON-USER: CPT | Performed by: NURSE PRACTITIONER

## 2024-06-10 PROCEDURE — 99214 OFFICE O/P EST MOD 30 MIN: CPT | Performed by: NURSE PRACTITIONER

## 2024-06-10 PROCEDURE — 3052F HG A1C>EQUAL 8.0%<EQUAL 9.0%: CPT | Performed by: NURSE PRACTITIONER

## 2024-06-10 PROCEDURE — 3080F DIAST BP >= 90 MM HG: CPT | Performed by: NURSE PRACTITIONER

## 2024-06-10 RX ORDER — DIPHENHYDRAMINE HYDROCHLORIDE 50 MG/ML
50 INJECTION INTRAMUSCULAR; INTRAVENOUS AS NEEDED
OUTPATIENT
Start: 2024-06-17

## 2024-06-10 RX ORDER — ALBUTEROL SULFATE 0.83 MG/ML
3 SOLUTION RESPIRATORY (INHALATION) AS NEEDED
OUTPATIENT
Start: 2024-06-17

## 2024-06-10 RX ORDER — EPINEPHRINE 0.3 MG/.3ML
0.3 INJECTION SUBCUTANEOUS EVERY 5 MIN PRN
OUTPATIENT
Start: 2024-06-17

## 2024-06-10 RX ORDER — FAMOTIDINE 10 MG/ML
20 INJECTION INTRAVENOUS ONCE AS NEEDED
OUTPATIENT
Start: 2024-06-17

## 2024-06-10 RX ORDER — HEPARIN SODIUM,PORCINE/PF 10 UNIT/ML
50 SYRINGE (ML) INTRAVENOUS AS NEEDED
OUTPATIENT
Start: 2024-06-17

## 2024-06-10 ASSESSMENT — COLUMBIA-SUICIDE SEVERITY RATING SCALE - C-SSRS
2. HAVE YOU ACTUALLY HAD ANY THOUGHTS OF KILLING YOURSELF?: NO
1. IN THE PAST MONTH, HAVE YOU WISHED YOU WERE DEAD OR WISHED YOU COULD GO TO SLEEP AND NOT WAKE UP?: NO
6. HAVE YOU EVER DONE ANYTHING, STARTED TO DO ANYTHING, OR PREPARED TO DO ANYTHING TO END YOUR LIFE?: NO

## 2024-06-10 ASSESSMENT — PAIN SCALES - GENERAL: PAINLEVEL: 0-NO PAIN

## 2024-06-10 ASSESSMENT — PATIENT HEALTH QUESTIONNAIRE - PHQ9
1. LITTLE INTEREST OR PLEASURE IN DOING THINGS: NOT AT ALL
2. FEELING DOWN, DEPRESSED OR HOPELESS: NOT AT ALL
SUM OF ALL RESPONSES TO PHQ9 QUESTIONS 1 AND 2: 0

## 2024-06-10 ASSESSMENT — ENCOUNTER SYMPTOMS
LOSS OF SENSATION IN FEET: 0
OCCASIONAL FEELINGS OF UNSTEADINESS: 0
DEPRESSION: 0

## 2024-06-10 NOTE — PROGRESS NOTES
Patient here alone for NPV with Satish Gallo NP; seen for MICHELL and restless legs.    Reconciled and reviewed medication and allergy list with patient.    Per NP, patient to return per her orders and patient is to start oral B12.  She will receive one dose of IV iron.    Reminded patient to check his My Chart for any updates to scheduling and to review medication list against what  has at home.  Also reviewed patient can obtain lab results on My Chart which will be reviewed at follow up visits.    No barriers to education noted, pt. Agreed to plan and verbalized understanding using teach back method

## 2024-06-28 ENCOUNTER — INFUSION (OUTPATIENT)
Dept: HEMATOLOGY/ONCOLOGY | Facility: CLINIC | Age: 34
End: 2024-06-28
Payer: COMMERCIAL

## 2024-06-28 VITALS
WEIGHT: 293 LBS | BODY MASS INDEX: 52.81 KG/M2 | SYSTOLIC BLOOD PRESSURE: 138 MMHG | RESPIRATION RATE: 16 BRPM | DIASTOLIC BLOOD PRESSURE: 82 MMHG | TEMPERATURE: 97 F | OXYGEN SATURATION: 99 % | HEART RATE: 82 BPM

## 2024-06-28 DIAGNOSIS — E61.1 IRON DEFICIENCY: ICD-10-CM

## 2024-06-28 PROCEDURE — 96365 THER/PROPH/DIAG IV INF INIT: CPT | Mod: INF

## 2024-06-28 PROCEDURE — 2500000004 HC RX 250 GENERAL PHARMACY W/ HCPCS (ALT 636 FOR OP/ED): Performed by: NURSE PRACTITIONER

## 2024-06-28 RX ORDER — EPINEPHRINE 0.3 MG/.3ML
0.3 INJECTION SUBCUTANEOUS EVERY 5 MIN PRN
OUTPATIENT
Start: 2024-06-28

## 2024-06-28 RX ORDER — FAMOTIDINE 10 MG/ML
20 INJECTION INTRAVENOUS ONCE AS NEEDED
OUTPATIENT
Start: 2024-06-28

## 2024-06-28 RX ORDER — ALBUTEROL SULFATE 0.83 MG/ML
3 SOLUTION RESPIRATORY (INHALATION) AS NEEDED
OUTPATIENT
Start: 2024-06-28

## 2024-06-28 RX ORDER — DIPHENHYDRAMINE HYDROCHLORIDE 50 MG/ML
50 INJECTION INTRAMUSCULAR; INTRAVENOUS AS NEEDED
OUTPATIENT
Start: 2024-06-28

## 2024-06-28 ASSESSMENT — PAIN SCALES - GENERAL: PAINLEVEL: 0-NO PAIN

## 2024-07-05 ENCOUNTER — APPOINTMENT (OUTPATIENT)
Dept: ENDOCRINOLOGY | Facility: CLINIC | Age: 34
End: 2024-07-05
Payer: COMMERCIAL

## 2024-07-10 ENCOUNTER — PROCEDURE VISIT (OUTPATIENT)
Dept: GASTROENTEROLOGY | Facility: CLINIC | Age: 34
End: 2024-07-10
Payer: COMMERCIAL

## 2024-07-10 DIAGNOSIS — K58.0 IRRITABLE BOWEL SYNDROME WITH DIARRHEA: ICD-10-CM

## 2024-07-10 DIAGNOSIS — R14.0 BLOATING: ICD-10-CM

## 2024-07-10 PROCEDURE — 91065 BREATH HYDROGEN/METHANE TEST: CPT | Performed by: NURSE PRACTITIONER

## 2024-07-10 PROCEDURE — 91065 BREATH HYDROGEN/METHANE TEST: CPT

## 2024-07-10 NOTE — PROGRESS NOTES
Patient ID: Arti Macias is a 33 y.o. female.    Breath Hydrogen Test-Dextrose    Date/Time: 7/10/2024 12:08 PM    Performed by: Lucy March RN  Authorized by: CONNOR Balderas    Consent:     Consent obtained:  Verbal    Consent given by:  Patient  Universal protocol:     Procedure explained and questions answered to patient or proxy's satisfaction: yes      Patient identity confirmed:  Verbally with patient  Sedation:     Sedation type:  None  Anesthesia:     Anesthesia method:  None  Post-procedure details:     Procedure completion:  Tolerated  Hydrogen Breath Analysis Consultation Sheet    Referring Provider: CONNOR Balderas  [address]  Department [place] [address]    Indication: R/O SIBO    Symptoms: Bloating (R14.0); Irritable bowel syndrome with diarrhea (K58.0)    Age: 33 y.o.  Weight: There were no vitals filed for this visit.  Substrate: Glucose   Dose: 75 gms    Last Meal: 7/9 19:30  Recent Antibiotics: Denies    RESULTS:   Time PPM (H2) APPM* (CH4) CO2 Correction   Baseline #1 8:55 3 0 3.6 1.52   Baseline #2 8:58 4 0 3.7 1.48   *Challenge Dose Sugar: 9:00  15' 9:15 7 1 3.7 1.48   30' 9:30 6 1 3.7 1.48   45' 9:45 4 1 3.8 1.44   60' 10:00 8 2 3.3 1.66   75' 10:15 4 1 4.2 1.30   90' 10:30 3 0 3.9 1.41   105' 10:45 4 1 3.8 1.44   120'        135'        150'        165'        180'          Impression: Negative for SIBO and methane

## 2024-07-17 ENCOUNTER — HOSPITAL ENCOUNTER (OUTPATIENT)
Dept: RADIOLOGY | Facility: CLINIC | Age: 34
Discharge: HOME | End: 2024-07-17
Payer: COMMERCIAL

## 2024-07-17 DIAGNOSIS — R92.8 OTHER ABNORMAL AND INCONCLUSIVE FINDINGS ON DIAGNOSTIC IMAGING OF BREAST: ICD-10-CM

## 2024-07-17 PROCEDURE — 76982 USE 1ST TARGET LESION: CPT | Mod: LT

## 2024-07-17 PROCEDURE — 76642 ULTRASOUND BREAST LIMITED: CPT | Mod: LT

## 2024-07-19 DIAGNOSIS — N63.0 MASS OF BREAST, UNSPECIFIED LATERALITY: Primary | ICD-10-CM

## 2024-07-26 ENCOUNTER — APPOINTMENT (OUTPATIENT)
Dept: ENDOCRINOLOGY | Facility: CLINIC | Age: 34
End: 2024-07-26
Payer: COMMERCIAL

## 2024-09-05 ENCOUNTER — APPOINTMENT (OUTPATIENT)
Dept: OBSTETRICS AND GYNECOLOGY | Facility: CLINIC | Age: 34
End: 2024-09-05
Payer: COMMERCIAL

## 2024-09-12 ENCOUNTER — OFFICE VISIT (OUTPATIENT)
Dept: OBSTETRICS AND GYNECOLOGY | Facility: CLINIC | Age: 34
End: 2024-09-12
Payer: COMMERCIAL

## 2024-09-12 VITALS
SYSTOLIC BLOOD PRESSURE: 131 MMHG | WEIGHT: 293 LBS | HEIGHT: 65 IN | BODY MASS INDEX: 48.82 KG/M2 | DIASTOLIC BLOOD PRESSURE: 81 MMHG

## 2024-09-12 DIAGNOSIS — Z01.419 ENCOUNTER FOR ANNUAL ROUTINE GYNECOLOGICAL EXAMINATION: Primary | ICD-10-CM

## 2024-09-12 PROCEDURE — 99395 PREV VISIT EST AGE 18-39: CPT | Performed by: OBSTETRICS & GYNECOLOGY

## 2024-09-12 PROCEDURE — 3052F HG A1C>EQUAL 8.0%<EQUAL 9.0%: CPT | Performed by: OBSTETRICS & GYNECOLOGY

## 2024-09-12 PROCEDURE — 3079F DIAST BP 80-89 MM HG: CPT | Performed by: OBSTETRICS & GYNECOLOGY

## 2024-09-12 PROCEDURE — 3049F LDL-C 100-129 MG/DL: CPT | Performed by: OBSTETRICS & GYNECOLOGY

## 2024-09-12 PROCEDURE — 3075F SYST BP GE 130 - 139MM HG: CPT | Performed by: OBSTETRICS & GYNECOLOGY

## 2024-09-12 PROCEDURE — 3008F BODY MASS INDEX DOCD: CPT | Performed by: OBSTETRICS & GYNECOLOGY

## 2024-09-12 RX ORDER — AMOXICILLIN AND CLAVULANATE POTASSIUM 875; 125 MG/1; MG/1
1 TABLET, FILM COATED ORAL 2 TIMES DAILY
COMMUNITY
Start: 2024-09-08 | End: 2024-09-18

## 2024-09-12 ASSESSMENT — LIFESTYLE VARIABLES
HOW OFTEN DO YOU HAVE A DRINK CONTAINING ALCOHOL: NEVER
HOW MANY STANDARD DRINKS CONTAINING ALCOHOL DO YOU HAVE ON A TYPICAL DAY: PATIENT DOES NOT DRINK
HOW OFTEN DO YOU HAVE SIX OR MORE DRINKS ON ONE OCCASION: NEVER
SKIP TO QUESTIONS 9-10: 1
AUDIT-C TOTAL SCORE: 0

## 2024-09-12 ASSESSMENT — ENCOUNTER SYMPTOMS
DEPRESSION: 0
LOSS OF SENSATION IN FEET: 0
OCCASIONAL FEELINGS OF UNSTEADINESS: 0

## 2024-09-12 ASSESSMENT — PATIENT HEALTH QUESTIONNAIRE - PHQ9
SUM OF ALL RESPONSES TO PHQ9 QUESTIONS 1 & 2: 0
1. LITTLE INTEREST OR PLEASURE IN DOING THINGS: NOT AT ALL
2. FEELING DOWN, DEPRESSED OR HOPELESS: NOT AT ALL

## 2024-09-12 ASSESSMENT — PAIN SCALES - GENERAL: PAINLEVEL: 0-NO PAIN

## 2024-09-12 NOTE — PROGRESS NOTES
Annual  Subjective   HPI:  Arti Macias is a 34 y.o. female  Patient's last menstrual period was 2024 (exact date). for annual exam.    Complaints:   none  Periods: regular   Dysmenorrhea:  none    GYNH:    Sexual activity   yes  Current contraception   none  History of abnormal pap smear   none    OB History          0    Para   0    Term   0       0    AB   0    Living   0         SAB   0    IAB   0    Ectopic   0    Multiple   0    Live Births   0                  Past Medical History:   Diagnosis Date    Allergic     Anemia     Anxiety     Bronchitis 2024    Diabetes mellitus (Multi)     Female infertility     GERD (gastroesophageal reflux disease)     Hypertension     Personal history of diseases of the skin and subcutaneous tissue     History of sebaceous cyst    Personal history of other diseases of the circulatory system 10/30/2019    History of hypertension    Personal history of other diseases of the digestive system     History of cholelithiasis    Personal history of other endocrine, nutritional and metabolic disease     History of diabetes mellitus    Polycystic ovary syndrome        Past Surgical History:   Procedure Laterality Date    BREAST BIOPSY      WISDOM TOOTH EXTRACTION         Prior to Admission medications    Medication Sig Start Date End Date Taking? Authorizing Provider   amoxicillin-pot clavulanate (Augmentin) 875-125 mg tablet Take 1 tablet by mouth twice a day. 24 Yes Historical Provider, MD   labetalol (Normodyne) 100 mg tablet Take 1 tablet (100 mg) by mouth every 12 hours. 3/14/24  Yes Ana Araujo,    metFORMIN XR (Glucophage-XR) 500 mg 24 hr tablet Take 2 tablets (1,000 mg) by mouth 2 times daily (morning and late afternoon). Do not crush, chew, or split. 6/3/24 6/3/25 Yes RAAD Zhao-ALEXUS       Social History     Tobacco Use    Smoking status: Never     Passive exposure: Never    Smokeless tobacco: Never   Vaping  "Use    Vaping status: Never Used   Substance Use Topics    Alcohol use: Not Currently     Comment: A couple times a year    Drug use: Never        Objective   /81   Ht 1.649 m (5' 4.92\")   Wt 144 kg (316 lb 6.4 oz)   LMP 09/04/2024 (Exact Date)   BMI 52.78 kg/m²    General:   Alert and oriented, in no acute distress   Neck: Supple. No visible thyromegaly.    Breast/Axilla: Normal to palpation bilaterally without masses, skin changes, or nipple discharge.    Abdomen: Soft, non-tender, without masses or organomegaly   Vulva: Normal architecture without erythema, masses, or lesions.    Vagina: Normal mucosa without lesions, masses, or atrophy. No abnormal vaginal discharge.    Cervix: Normal without masses, lesions, or signs of cervicitis   Uterus: Normal, mobile, non-enlarged uterus   Adnexa: Normal without masses or lesions   Pelvic Floor normal   Psych Normal affect. Normal mood.      Assessment/Plan   Problem List Items Addressed This Visit    None  Visit Diagnoses         Codes    Encounter for annual routine gynecological examination    -  Primary Z01.419          Routine annual  Pap due 2025  Encouraged pt to actively manage DM  STI screening as indicated  Birth control counseling: Barrier methods of contraception for prevention of STIs.   Preventative hygiene habits.  Health promoting habits: Exercise: 30-60 minutes 3 to 5 days/week. Diet: Healthy diet and drink plenty of water each day.      Chantell Hernández MD   "

## 2024-09-18 ENCOUNTER — APPOINTMENT (OUTPATIENT)
Dept: PRIMARY CARE | Facility: CLINIC | Age: 34
End: 2024-09-18
Payer: COMMERCIAL

## 2024-09-18 VITALS
WEIGHT: 293 LBS | BODY MASS INDEX: 53.22 KG/M2 | HEART RATE: 99 BPM | SYSTOLIC BLOOD PRESSURE: 137 MMHG | DIASTOLIC BLOOD PRESSURE: 86 MMHG | OXYGEN SATURATION: 98 %

## 2024-09-18 DIAGNOSIS — E11.65 TYPE 2 DIABETES MELLITUS WITH HYPERGLYCEMIA, WITHOUT LONG-TERM CURRENT USE OF INSULIN: ICD-10-CM

## 2024-09-18 DIAGNOSIS — I10 PRIMARY HYPERTENSION: ICD-10-CM

## 2024-09-18 DIAGNOSIS — G25.81 RESTLESS LEGS: ICD-10-CM

## 2024-09-18 DIAGNOSIS — F41.9 ANXIETY: Primary | ICD-10-CM

## 2024-09-18 DIAGNOSIS — R10.11 RUQ PAIN: ICD-10-CM

## 2024-09-18 DIAGNOSIS — E61.1 IRON DEFICIENCY: ICD-10-CM

## 2024-09-18 DIAGNOSIS — H65.92 LEFT OTITIS MEDIA WITH EFFUSION: ICD-10-CM

## 2024-09-18 DIAGNOSIS — Z00.00 ENCOUNTER FOR ROUTINE ADULT HEALTH EXAMINATION WITHOUT ABNORMAL FINDINGS: ICD-10-CM

## 2024-09-18 PROCEDURE — 1036F TOBACCO NON-USER: CPT | Performed by: INTERNAL MEDICINE

## 2024-09-18 PROCEDURE — 99395 PREV VISIT EST AGE 18-39: CPT | Performed by: INTERNAL MEDICINE

## 2024-09-18 PROCEDURE — 3052F HG A1C>EQUAL 8.0%<EQUAL 9.0%: CPT | Performed by: INTERNAL MEDICINE

## 2024-09-18 PROCEDURE — 3049F LDL-C 100-129 MG/DL: CPT | Performed by: INTERNAL MEDICINE

## 2024-09-18 PROCEDURE — 3079F DIAST BP 80-89 MM HG: CPT | Performed by: INTERNAL MEDICINE

## 2024-09-18 PROCEDURE — 3075F SYST BP GE 130 - 139MM HG: CPT | Performed by: INTERNAL MEDICINE

## 2024-09-18 PROCEDURE — 99214 OFFICE O/P EST MOD 30 MIN: CPT | Performed by: INTERNAL MEDICINE

## 2024-09-18 ASSESSMENT — PAIN SCALES - GENERAL: PAINLEVEL: 2

## 2024-09-18 NOTE — PROGRESS NOTES
Subjective     Patient ID: Arti Macias is a 34 y.o. female who presents for Annual Exam and Follow-up.  HPI    34F here for preventive care visit, last seen in June.     Returned [place] and found to have ear infection and bronchitis seen at [facility] clinic treated with zpak and albuterol. After persistent symptoms noted to have persistent AOM and fluid given augmentin. She continues to have congestion and cough. Some improvement in cough. Cough is severe that promotes vomiting.  Notes cough that is worse in the right upper quadrant, known history of gallstones    PMHx:  - JACKIE - chronic - previously on several medications discontinued in adulthood, was to have been seen by therapist her sister referred her to though she did not see anyone. Not interested in medical management.  - RLS/ Iron deficiency - on iron supplement every other day. Seen by Bina inman recommend EGD and colonoscopy not yet seen, seen by heme started venofer with significant improvement in symptoms, though now has had to resume compression stockings.   - Class 3 obesity -  previously on phentermine  - HTN  - on labetolol trying to conceive has an appointment with Dr. Palma at [facility].   - DM2  - with neuropathy A1C 7.6% diagnosed at 16, type 2. Takes metformin 1000mg BID followed by endocrinology, goal <6.5% prior to fertility treatment, precontemplative. Last A1C 6/24 8.4% not interested in further medication or injection management.   - Gallstones for the past 10 years. not an issue for her last flare over 20 years ago. Gets intermittent flares  - Sleep disorder referred to sleep medicine unable to afford to be seen.   - Lower extremity edema thought secondary to venous insufficiency   - Family of breast cancer and dense breasts - seen at breast at [facility] recommended q6m exams      Social:   - Lives at home with ,  at [facility]   - no children, goal is for her to have a child   - Sisters with Annamaria  Benton      Objective       Current Outpatient Medications:     labetalol (Normodyne) 100 mg tablet, Take 1 tablet (100 mg) by mouth every 12 hours., Disp: 180 tablet, Rfl: 3    metFORMIN XR (Glucophage-XR) 500 mg 24 hr tablet, Take 2 tablets (1,000 mg) by mouth 2 times daily (morning and late afternoon). Do not crush, chew, or split., Disp: 360 tablet, Rfl: 3      /86   Pulse 99   Wt 145 kg (319 lb)   LMP 09/04/2024 (Exact Date)   SpO2 98%   BMI 53.22 kg/m²       Physical Examination:   General: Awake, alert, appears stated age   Head/eyes/ears: NCAT, EOMI, PERRL, TM WNL, fluid appreciated in the left middle ear without acute inflammation  Throat: moist mucus membranes, no pharyngeal erythema  Neck: Supple, nontender, no lymphadenopathy, thyroid exam unremarkable   Breast exam: deferred   Heart: RRR, no murmurs, rubs or gallops  Lungs: CTA bilaterally, no rhonchi rales or wheezes   Abdomen: Soft, reproducible right upper quadrant tenderness on palpation without rebound or guarding  Extremities: Trace edema, 2+ DP pulses   Skin: No concerning skin lesions on visualized skin   Neuro: AAO x 3, no FND, gait unremarkable    Assessment/Plan   Assessment & Plan  Encounter for routine adult health examination without abnormal findings    Adult Health Examination  Age appropriate screening performed   Healthy lifestyle reviewed.   Depression screen   No additional pertinent family history or toxic habits   No high risk sexual behavior,   Cancer screening:   - Colonoscopy at 43   - Mammogram + ultrasound ordered by Dr. Hernández stable eft breast mass has followup with high risk breast specialist.   - Pap smear due 2025 seen by Dr. Hernández last week   - Skin cancer prevention strategies reviewed   Immunizations: declines all.   Dental and visual examinations UTD   Discussed adequate Vitamin D intake   Orders:    Follow Up In [facility] - PCP - Health Maintenance    Anxiety  Declines medical management    Resources provided for behavioral, encouraged to schedule.          Iron deficiency  Has had iron infusion recommended followup in 3-6 months.   Did feel improved symptoms of RLS but not recurred.          Primary hypertension  On labetalol recommended home blood pressure monitoring and will uptitrate if necessary.         RUQ pain  Without fever or other clinical signs of acute cholecystitis  Obtain ultrasound, refer to surgery and consideration for cholecystectomy  Orders:    US gallbladder; Future    Referral to General Surgery; Future    Follow Up In [facility] - PCP - Established; Future    Left otitis media with effusion  Advised continued supportive measures including consistent use of Flonase, counseling provided on appropriate use  Add nonsedating antihistamine such as Zyrtec Allegra or Claritin.         Type 2 diabetes mellitus with hyperglycemia, without long-term current use of insulin (Multi)  Followed by endocrinology continuing metformin  Diabetes remains uncontrolled and A1c in fact is now worsening from prior  Again had long discussion that risks of uncontrolled diabetes outweigh the potential risks of more aggressive management such as institution of GLP-1 agonist or SGLT2 inhibitor.  Patient continues to decline treatment at this time.       Restless legs  With significant improvement in symptoms after IV infusion of iron, intolerant to oral iron  To follow-up with hematology as well as gastroenterology       Follow-up 3 months

## 2024-09-18 NOTE — ASSESSMENT & PLAN NOTE
Has had iron infusion recommended followup in 3-6 months.   Did feel improved symptoms of RLS but not recurred.

## 2024-09-19 NOTE — ASSESSMENT & PLAN NOTE
With significant improvement in symptoms after IV infusion of iron, intolerant to oral iron  To follow-up with hematology as well as gastroenterology       Follow-up 3 months

## 2024-09-19 NOTE — ASSESSMENT & PLAN NOTE
Followed by endocrinology continuing metformin  Diabetes remains uncontrolled and A1c in fact is now worsening from prior  Again had long discussion that risks of uncontrolled diabetes outweigh the potential risks of more aggressive management such as institution of GLP-1 agonist or SGLT2 inhibitor.  Patient continues to decline treatment at this time.

## 2024-10-07 ENCOUNTER — APPOINTMENT (OUTPATIENT)
Dept: SURGERY | Facility: CLINIC | Age: 34
End: 2024-10-07
Payer: COMMERCIAL

## 2024-10-08 ENCOUNTER — APPOINTMENT (OUTPATIENT)
Dept: RADIOLOGY | Facility: HOSPITAL | Age: 34
End: 2024-10-08
Payer: COMMERCIAL

## 2024-10-09 ENCOUNTER — APPOINTMENT (OUTPATIENT)
Dept: RADIOLOGY | Facility: HOSPITAL | Age: 34
End: 2024-10-09
Payer: COMMERCIAL

## 2024-10-10 ENCOUNTER — HOSPITAL ENCOUNTER (OUTPATIENT)
Dept: RADIOLOGY | Facility: HOSPITAL | Age: 34
Discharge: HOME | End: 2024-10-10
Payer: COMMERCIAL

## 2024-10-10 DIAGNOSIS — R10.11 RUQ PAIN: ICD-10-CM

## 2024-10-10 PROCEDURE — 76705 ECHO EXAM OF ABDOMEN: CPT

## 2024-10-10 PROCEDURE — 76705 ECHO EXAM OF ABDOMEN: CPT | Performed by: RADIOLOGY

## 2024-10-11 ENCOUNTER — OFFICE VISIT (OUTPATIENT)
Dept: SURGERY | Facility: CLINIC | Age: 34
End: 2024-10-11
Payer: COMMERCIAL

## 2024-10-11 VITALS
DIASTOLIC BLOOD PRESSURE: 82 MMHG | BODY MASS INDEX: 48.82 KG/M2 | WEIGHT: 293 LBS | HEIGHT: 65 IN | HEART RATE: 103 BPM | SYSTOLIC BLOOD PRESSURE: 149 MMHG

## 2024-10-11 DIAGNOSIS — R10.11 RUQ PAIN: ICD-10-CM

## 2024-10-11 PROCEDURE — 3049F LDL-C 100-129 MG/DL: CPT | Performed by: SURGERY

## 2024-10-11 PROCEDURE — 1036F TOBACCO NON-USER: CPT | Performed by: SURGERY

## 2024-10-11 PROCEDURE — 99203 OFFICE O/P NEW LOW 30 MIN: CPT | Performed by: SURGERY

## 2024-10-11 PROCEDURE — 3079F DIAST BP 80-89 MM HG: CPT | Performed by: SURGERY

## 2024-10-11 PROCEDURE — 3008F BODY MASS INDEX DOCD: CPT | Performed by: SURGERY

## 2024-10-11 PROCEDURE — 3052F HG A1C>EQUAL 8.0%<EQUAL 9.0%: CPT | Performed by: SURGERY

## 2024-10-11 PROCEDURE — 99213 OFFICE O/P EST LOW 20 MIN: CPT | Performed by: SURGERY

## 2024-10-11 PROCEDURE — 3077F SYST BP >= 140 MM HG: CPT | Performed by: SURGERY

## 2024-10-11 ASSESSMENT — ENCOUNTER SYMPTOMS
VOMITING: 0
DEPRESSION: 0
CHILLS: 0
NAUSEA: 0
ABDOMINAL PAIN: 1
DIARRHEA: 1
FEVER: 0
FATIGUE: 0
UNEXPECTED WEIGHT CHANGE: 0
CONSTIPATION: 0

## 2024-10-11 ASSESSMENT — PAIN SCALES - GENERAL: PAINLEVEL: 4

## 2024-10-11 NOTE — LETTER
"October 11, 2024     Ana Araujo DO  [address] [facility] [address]    Patient: Arti Macias   YOB: 1990   Date of Visit: 10/11/2024       Dear Dr. Ana Araujo DO:    Thank you for referring Arti Macias to me for evaluation. Below are my notes for this consultation.  If you have questions, please do not hesitate to call me. I look forward to following your patient along with you.       Sincerely,     Yash Carranza MD      CC: No Recipients  ______________________________________________________________________________________    Subjective  Patient ID: Arti Macias is a 34 y.o. female who presents for Cholelithiasis.  HPI  Ms Macias is a 34 year old female who is referred for symptomatic gallstones.     She saw her PCP recently and complained of some vague RUQ abdominal pain she has had for a few month.  No n/v.  Not made worse with meals.  She was sent for RUQ ultrasound whose read is pending at this time.     She states that she has had RUQ pain for the past 10 years or so. She has known gallstones, as seen on US 2020 (3 cm gallstone), CT 2023. She recalls one singular \"gall bladder attack\" many years ago, but other than that, she describes waxing and waning discomfort as her main symptom.      Allergies: lisinopril, lovastatin     Referred by primary Dr. Araujo.    Review of Systems   Constitutional:  Negative for chills, fatigue, fever and unexpected weight change.   Gastrointestinal:  Positive for abdominal pain and diarrhea. Negative for constipation, nausea and vomiting.       Patient has never smoked tobacco or used smokeless tobacco. Drinks alcohol 2x/year.    Past Medical History:   Diagnosis Date   • Allergic    • Anemia    • Anxiety    • Bronchitis 09/08/2024   • Diabetes mellitus (Multi)    • Female infertility    • GERD (gastroesophageal reflux disease)    • Hypertension    • Personal history of diseases of the skin and subcutaneous tissue     History " of sebaceous cyst   • Personal history of other diseases of the circulatory system 10/30/2019    History of hypertension   • Personal history of other diseases of the digestive system     History of cholelithiasis   • Personal history of other endocrine, nutritional and metabolic disease     History of diabetes mellitus   • Polycystic ovary syndrome     Morbid obesity (BMI 53)    Past Surgical History:   Procedure Laterality Date   • BREAST BIOPSY     • WISDOM TOOTH EXTRACTION  2021            Objective  CT ABDOMEN AND PELVIS WO CONTRAST;  8/17/2023   GALLBLADDER:   Cholelithiasis. No gallbladder wall thickening or pericholecystic   fluid.     US ABD RIGHT UPPER QUADRANT Jun 11 2020   Biliary: No intrahepatic biliary duct dilation.        CBD: 0.5 cm at the hilum.        Gallbladder: Normal caliber             -Contents:  Cholelithiasis.  The largest gallstone measures 3.1   x 2.5 x 2.6 cm.             -Wall:  Normal             -Other:  No pericholecystic fluid.     Physical Exam  Constitutional:       General: She is not in acute distress.     Appearance: Normal appearance. She is obese. She is not ill-appearing.   Abdominal:      Palpations: Abdomen is soft.      Tenderness: There is abdominal tenderness in the right upper quadrant. There is no guarding or rebound.   Neurological:      Mental Status: She is alert.   Psychiatric:         Mood and Affect: Mood normal.         Behavior: Behavior normal.         Assessment/Plan       34-year-old female with history of morbid obesity who over the past month or so has had symptoms of biliary colic.  Underwent ultrasound yesterday, results pending.  However previous imaging shows her to have a 3 cm gallstone.    I did strongly recommend laparoscopic cholecystectomy.  We discussed the procedure to include risk benefits alternatives.  She is very anxious and reluctant to consider surgery.  She would like for us to review the results of the ultrasound that is pending  before deciding on surgery.    I will give her a call once this ultrasound has been read

## 2024-10-11 NOTE — PROGRESS NOTES
"Subjective   Patient ID: Arti Macias is a 34 y.o. female who presents for Cholelithiasis.  HPI  Ms Macias is a 34 year old female who is referred for symptomatic gallstones.     She saw her PCP recently and complained of some vague RUQ abdominal pain she has had for a few month.  No n/v.  Not made worse with meals.  She was sent for RUQ ultrasound whose read is pending at this time.     She states that she has had RUQ pain for the past 10 years or so. She has known gallstones, as seen on US 2020 (3 cm gallstone), CT 2023. She recalls one singular \"gall bladder attack\" many years ago, but other than that, she describes waxing and waning discomfort as her main symptom.      Allergies: lisinopril, lovastatin     Referred by primary Dr. Araujo.    Review of Systems   Constitutional:  Negative for chills, fatigue, fever and unexpected weight change.   Gastrointestinal:  Positive for abdominal pain and diarrhea. Negative for constipation, nausea and vomiting.       Patient has never smoked tobacco or used smokeless tobacco. Drinks alcohol 2x/year.    Past Medical History:   Diagnosis Date    Allergic     Anemia     Anxiety     Bronchitis 09/08/2024    Diabetes mellitus (Multi)     Female infertility     GERD (gastroesophageal reflux disease)     Hypertension     Personal history of diseases of the skin and subcutaneous tissue     History of sebaceous cyst    Personal history of other diseases of the circulatory system 10/30/2019    History of hypertension    Personal history of other diseases of the digestive system     History of cholelithiasis    Personal history of other endocrine, nutritional and metabolic disease     History of diabetes mellitus    Polycystic ovary syndrome     Morbid obesity (BMI 53)    Past Surgical History:   Procedure Laterality Date    BREAST BIOPSY      WISDOM TOOTH EXTRACTION  2021            Objective   CT ABDOMEN AND PELVIS WO CONTRAST;  8/17/2023   GALLBLADDER:   Cholelithiasis. No " gallbladder wall thickening or pericholecystic   fluid.     US ABD RIGHT UPPER QUADRANT Santosh 11 2020   Biliary: No intrahepatic biliary duct dilation.        CBD: 0.5 cm at the hilum.        Gallbladder: Normal caliber             -Contents:  Cholelithiasis.  The largest gallstone measures 3.1   x 2.5 x 2.6 cm.             -Wall:  Normal             -Other:  No pericholecystic fluid.     Physical Exam  Constitutional:       General: She is not in acute distress.     Appearance: Normal appearance. She is obese. She is not ill-appearing.   Abdominal:      Palpations: Abdomen is soft.      Tenderness: There is abdominal tenderness in the right upper quadrant. There is no guarding or rebound.   Neurological:      Mental Status: She is alert.   Psychiatric:         Mood and Affect: Mood normal.         Behavior: Behavior normal.         Assessment/Plan        34-year-old female with history of morbid obesity who over the past month or so has had symptoms of biliary colic.  Underwent ultrasound yesterday, results pending.  However previous imaging shows her to have a 3 cm gallstone.    I did strongly recommend laparoscopic cholecystectomy.  We discussed the procedure to include risk benefits alternatives.  She is very anxious and reluctant to consider surgery.  She would like for us to review the results of the ultrasound that is pending before deciding on surgery.    I will give her a call once this ultrasound has been read

## 2024-10-22 ENCOUNTER — APPOINTMENT (OUTPATIENT)
Dept: HEMATOLOGY/ONCOLOGY | Facility: CLINIC | Age: 34
End: 2024-10-22
Payer: COMMERCIAL

## 2024-11-22 ENCOUNTER — LAB (OUTPATIENT)
Dept: LAB | Facility: LAB | Age: 34
End: 2024-11-22
Payer: COMMERCIAL

## 2024-11-22 DIAGNOSIS — E61.1 IRON DEFICIENCY: ICD-10-CM

## 2024-11-22 LAB
BASOPHILS # BLD AUTO: 0.07 X10*3/UL (ref 0–0.1)
BASOPHILS NFR BLD AUTO: 0.6 %
EOSINOPHIL # BLD AUTO: 0.13 X10*3/UL (ref 0–0.7)
EOSINOPHIL NFR BLD AUTO: 1.2 %
ERYTHROCYTE [DISTWIDTH] IN BLOOD BY AUTOMATED COUNT: 14 % (ref 11.5–14.5)
FERRITIN SERPL-MCNC: 65 NG/ML (ref 8–150)
HCT VFR BLD AUTO: 41.3 % (ref 36–46)
HGB BLD-MCNC: 13.1 G/DL (ref 12–16)
IMM GRANULOCYTES # BLD AUTO: 0.03 X10*3/UL (ref 0–0.7)
IMM GRANULOCYTES NFR BLD AUTO: 0.3 % (ref 0–0.9)
LYMPHOCYTES # BLD AUTO: 3.06 X10*3/UL (ref 1.2–4.8)
LYMPHOCYTES NFR BLD AUTO: 27.9 %
MCH RBC QN AUTO: 25.9 PG (ref 26–34)
MCHC RBC AUTO-ENTMCNC: 31.7 G/DL (ref 32–36)
MCV RBC AUTO: 82 FL (ref 80–100)
MONOCYTES # BLD AUTO: 0.69 X10*3/UL (ref 0.1–1)
MONOCYTES NFR BLD AUTO: 6.3 %
NEUTROPHILS # BLD AUTO: 6.99 X10*3/UL (ref 1.2–7.7)
NEUTROPHILS NFR BLD AUTO: 63.7 %
NRBC BLD-RTO: 0 /100 WBCS (ref 0–0)
PLATELET # BLD AUTO: 337 X10*3/UL (ref 150–450)
RBC # BLD AUTO: 5.05 X10*6/UL (ref 4–5.2)
WBC # BLD AUTO: 11 X10*3/UL (ref 4.4–11.3)

## 2024-11-22 PROCEDURE — 36415 COLL VENOUS BLD VENIPUNCTURE: CPT

## 2024-11-22 PROCEDURE — 83550 IRON BINDING TEST: CPT

## 2024-11-22 PROCEDURE — 82728 ASSAY OF FERRITIN: CPT

## 2024-11-22 PROCEDURE — 83540 ASSAY OF IRON: CPT

## 2024-11-22 PROCEDURE — 85025 COMPLETE CBC W/AUTO DIFF WBC: CPT

## 2024-11-24 LAB
IRON SATN MFR SERPL: 12 % (ref 25–45)
IRON SERPL-MCNC: 44 UG/DL (ref 35–150)
TIBC SERPL-MCNC: 354 UG/DL (ref 240–445)
UIBC SERPL-MCNC: 310 UG/DL (ref 110–370)

## 2024-11-25 ENCOUNTER — APPOINTMENT (OUTPATIENT)
Dept: LAB | Facility: CLINIC | Age: 34
End: 2024-11-25
Payer: COMMERCIAL

## 2024-11-25 ENCOUNTER — OFFICE VISIT (OUTPATIENT)
Dept: HEMATOLOGY/ONCOLOGY | Facility: CLINIC | Age: 34
End: 2024-11-25
Payer: COMMERCIAL

## 2024-11-25 ENCOUNTER — APPOINTMENT (OUTPATIENT)
Dept: HEMATOLOGY/ONCOLOGY | Facility: CLINIC | Age: 34
End: 2024-11-25
Payer: COMMERCIAL

## 2024-11-25 VITALS
RESPIRATION RATE: 18 BRPM | SYSTOLIC BLOOD PRESSURE: 151 MMHG | TEMPERATURE: 97.2 F | BODY MASS INDEX: 53.34 KG/M2 | HEART RATE: 84 BPM | OXYGEN SATURATION: 100 % | DIASTOLIC BLOOD PRESSURE: 78 MMHG | WEIGHT: 293 LBS

## 2024-11-25 DIAGNOSIS — E53.8 VITAMIN B12 DEFICIENCY: ICD-10-CM

## 2024-11-25 DIAGNOSIS — E61.1 IRON DEFICIENCY: Primary | ICD-10-CM

## 2024-11-25 LAB
CMV IGG AVIDITY SERPL IA-RTO: REACTIVE %
EBV EA IGG SER QL: POSITIVE
EBV NA AB SER QL: POSITIVE
EBV VCA IGG SER IA-ACNC: POSITIVE
EBV VCA IGM SER IA-ACNC: NEGATIVE
HBV CORE IGM SER QL: NONREACTIVE
HBV SURFACE AB SER-ACNC: 26.6 MIU/ML
HBV SURFACE AG SERPL QL IA: NONREACTIVE
HCV AB SER QL: NONREACTIVE
HIV 1+2 AB+HIV1 P24 AG SERPL QL IA: NONREACTIVE
IGA SERPL-MCNC: 189 MG/DL (ref 70–400)
IGG SERPL-MCNC: 1230 MG/DL (ref 700–1600)
IGM SERPL-MCNC: 95 MG/DL (ref 40–230)
PROT SERPL-MCNC: 7 G/DL (ref 6.4–8.2)
VIT B12 SERPL-MCNC: 299 PG/ML (ref 211–911)

## 2024-11-25 PROCEDURE — 86644 CMV ANTIBODY: CPT | Performed by: NURSE PRACTITIONER

## 2024-11-25 PROCEDURE — 86645 CMV ANTIBODY IGM: CPT | Performed by: NURSE PRACTITIONER

## 2024-11-25 PROCEDURE — 3052F HG A1C>EQUAL 8.0%<EQUAL 9.0%: CPT | Performed by: NURSE PRACTITIONER

## 2024-11-25 PROCEDURE — 82784 ASSAY IGA/IGD/IGG/IGM EACH: CPT | Performed by: NURSE PRACTITIONER

## 2024-11-25 PROCEDURE — 83521 IG LIGHT CHAINS FREE EACH: CPT | Performed by: NURSE PRACTITIONER

## 2024-11-25 PROCEDURE — 87340 HEPATITIS B SURFACE AG IA: CPT | Performed by: NURSE PRACTITIONER

## 2024-11-25 PROCEDURE — 86803 HEPATITIS C AB TEST: CPT | Performed by: NURSE PRACTITIONER

## 2024-11-25 PROCEDURE — 99215 OFFICE O/P EST HI 40 MIN: CPT | Performed by: NURSE PRACTITIONER

## 2024-11-25 PROCEDURE — 86664 EPSTEIN-BARR NUCLEAR ANTIGEN: CPT | Performed by: NURSE PRACTITIONER

## 2024-11-25 PROCEDURE — 3049F LDL-C 100-129 MG/DL: CPT | Performed by: NURSE PRACTITIONER

## 2024-11-25 PROCEDURE — 84155 ASSAY OF PROTEIN SERUM: CPT | Performed by: NURSE PRACTITIONER

## 2024-11-25 PROCEDURE — 3077F SYST BP >= 140 MM HG: CPT | Performed by: NURSE PRACTITIONER

## 2024-11-25 PROCEDURE — 84165 PROTEIN E-PHORESIS SERUM: CPT | Performed by: NURSE PRACTITIONER

## 2024-11-25 PROCEDURE — 87389 HIV-1 AG W/HIV-1&-2 AB AG IA: CPT | Performed by: NURSE PRACTITIONER

## 2024-11-25 PROCEDURE — 86706 HEP B SURFACE ANTIBODY: CPT | Performed by: NURSE PRACTITIONER

## 2024-11-25 PROCEDURE — 3078F DIAST BP <80 MM HG: CPT | Performed by: NURSE PRACTITIONER

## 2024-11-25 PROCEDURE — 82607 VITAMIN B-12: CPT | Performed by: NURSE PRACTITIONER

## 2024-11-25 PROCEDURE — 86705 HEP B CORE ANTIBODY IGM: CPT | Performed by: NURSE PRACTITIONER

## 2024-11-25 RX ORDER — DIPHENHYDRAMINE HYDROCHLORIDE 50 MG/ML
50 INJECTION INTRAMUSCULAR; INTRAVENOUS AS NEEDED
OUTPATIENT
Start: 2024-12-09

## 2024-11-25 RX ORDER — PSYLLIUM HUSK 0.4 G
2 CAPSULE ORAL DAILY
COMMUNITY

## 2024-11-25 RX ORDER — EPINEPHRINE 0.3 MG/.3ML
0.3 INJECTION SUBCUTANEOUS EVERY 5 MIN PRN
OUTPATIENT
Start: 2024-12-09

## 2024-11-25 RX ORDER — ALBUTEROL SULFATE 0.83 MG/ML
3 SOLUTION RESPIRATORY (INHALATION) AS NEEDED
OUTPATIENT
Start: 2024-12-09

## 2024-11-25 RX ORDER — CYANOCOBALAMIN 1000 UG/ML
1000 INJECTION, SOLUTION INTRAMUSCULAR; SUBCUTANEOUS ONCE
OUTPATIENT
Start: 2024-12-09

## 2024-11-25 RX ORDER — FAMOTIDINE 10 MG/ML
20 INJECTION INTRAVENOUS ONCE AS NEEDED
OUTPATIENT
Start: 2024-12-09

## 2024-11-25 ASSESSMENT — ENCOUNTER SYMPTOMS
RESPIRATORY NEGATIVE: 1
CARDIOVASCULAR NEGATIVE: 1
FEVER: 0
UNEXPECTED WEIGHT CHANGE: 0
CHILLS: 0
TROUBLE SWALLOWING: 0
GASTROINTESTINAL NEGATIVE: 1
HEMATOLOGIC/LYMPHATIC NEGATIVE: 1
DIAPHORESIS: 0
FATIGUE: 1
NEUROLOGICAL NEGATIVE: 1
EYES NEGATIVE: 1
SORE THROAT: 0
MUSCULOSKELETAL NEGATIVE: 1

## 2024-11-25 ASSESSMENT — PAIN SCALES - GENERAL: PAINLEVEL_OUTOF10: 0-NO PAIN

## 2024-11-25 NOTE — PROGRESS NOTES
Patient ID: Arti Macias is a 34 y.o. female.    Primary Care Provider: Ana Araujo DO  Visit Type: Follow Up      Subjective    HPI  Patient presented for  hematology consultation as referred by her primary care provider.  She reports having iron deficiency for several years now.  This is likely due to previous history of heavy menses with menses lasting up to 1 month which she states is not a current issue.   She has had intermittent anemia.  She has complaints of ongoing fatigue and worsening restless leg syndrome prompting referral to hematology.  She has tried oral iron causing constipation.   She feels the restless leg syndrome was improved by IV iron she received 6/28/24  She denies any fevers, chills or night sweats. No cough, chest pain or shortness of breath. No nausea or vomiting. No constipation or diarrhea. No signs or symptoms of active bleeding or unusual bruising.   Her B12 level was 222 on 6/3/24 and she is on no B12 supplements.     Review of Systems   Constitutional:  Positive for fatigue. Negative for chills, diaphoresis, fever and unexpected weight change.   HENT:   Negative for hearing loss, lump/mass, mouth sores, nosebleeds, sore throat, tinnitus and trouble swallowing.    Eyes: Negative.    Respiratory: Negative.     Cardiovascular: Negative.    Gastrointestinal: Negative.    Musculoskeletal: Negative.    Skin: Negative.         Hair is thin   Neurological: Negative.    Hematological: Negative.       Objective   BSA: 2.58 meters squared  /78 (BP Location: Left arm, Patient Position: Sitting, BP Cuff Size: Large adult)   Pulse 84   Temp 36.2 °C (97.2 °F) (Temporal)   Resp 18   Wt 145 kg (320 lb 8.8 oz)   SpO2 100%   BMI 53.34 kg/m²      has a past medical history of Allergic, Anemia, Anxiety, Bronchitis (09/08/2024), Diabetes mellitus (Multi), Female infertility, GERD (gastroesophageal reflux disease), Hypertension, Personal history of diseases of the skin and  subcutaneous tissue, Personal history of other diseases of the circulatory system (10/30/2019), Personal history of other diseases of the digestive system, Personal history of other endocrine, nutritional and metabolic disease, and Polycystic ovary syndrome.   has a past surgical history that includes [place] tooth extraction (2021) and Breast biopsy.  Family History   Problem Relation Name Age of Onset    Anesthesia related problems Mother Cannot had lidacaine     Hyperlipidemia Mother Cannot had lidacaine     Other (Prediabetes) Mother Cannot had lidacaine     Diabetes Father Pedrito     Prostate cancer Father Pedrito     Hypertension Father Pedrito     Alcohol abuse Father Pedrito     Raynaud syndrome Sister      Heart attack Father's Brother      Diabetes Maternal Grandfather Amari     Diabetes Paternal Grandfather      Prostate cancer Paternal Grandfather      Other (pac maker) Paternal Grandfather           Arti Macias  reports that she has never smoked. She has never been exposed to tobacco smoke. She has never used smokeless tobacco.  She  reports that she does not currently use alcohol.  She  reports no history of drug use.    Physical Exam  Constitutional:       Appearance: She is obese.      Comments: Hair is thin   Eyes:      Conjunctiva/sclera: Conjunctivae normal.      Pupils: Pupils are equal, round, and reactive to light.   Cardiovascular:      Rate and Rhythm: Normal rate and regular rhythm.      Pulses: Normal pulses.      Heart sounds: Normal heart sounds.   Pulmonary:      Effort: Pulmonary effort is normal. No respiratory distress.      Breath sounds: Normal breath sounds. No wheezing.   Abdominal:      General: There is no distension.      Tenderness: There is no abdominal tenderness.   Musculoskeletal:         General: Normal range of motion.   Lymphadenopathy:      Cervical: No cervical adenopathy.   Skin:     Coloration: Skin is not jaundiced.      Findings: No bruising.   Neurological:      Motor: No  weakness.     WBC   Date/Time Value Ref Range Status   11/22/2024 03:06 PM 11.0 4.4 - 11.3 x10*3/uL Final   06/03/2024 09:49 AM 8.4 4.4 - 11.3 x10*3/uL Final   06/28/2023 06:50 AM 9.7 4.4 - 11.3 x10E9/L Final   04/13/2023 11:25 AM 7.7 4.4 - 11.3 x10E9/L Final   07/18/2022 06:32 AM 8.8 4.4 - 11.3 x10E9/L Final     nRBC   Date Value Ref Range Status   11/22/2024 0.0 0.0 - 0.0 /100 WBCs Final   06/03/2024 0.0 0.0 - 0.0 /100 WBCs Final   06/28/2023 0.0 0.0 - 0.0 /100 WBC Final   04/13/2023 0.0 0.0 - 0.0 /100 WBC Final   07/18/2022 0.0 0.0 - 0.0 /100 WBC Final     RBC   Date Value Ref Range Status   11/22/2024 5.05 4.00 - 5.20 x10*6/uL Final   06/03/2024 4.86 4.00 - 5.20 x10*6/uL Final   06/28/2023 4.97 4.00 - 5.20 x10E12/L Final   04/13/2023 5.00 4.00 - 5.20 x10E12/L Final   07/18/2022 4.87 4.00 - 5.20 x10E12/L Final     Hemoglobin   Date Value Ref Range Status   11/22/2024 13.1 12.0 - 16.0 g/dL Final   06/03/2024 12.1 12.0 - 16.0 g/dL Final   06/28/2023 12.1 12.0 - 16.0 g/dL Final   04/13/2023 11.9 (L) 12.0 - 16.0 g/dL Final   07/18/2022 12.3 12.0 - 16.0 g/dL Final     Hematocrit   Date Value Ref Range Status   11/22/2024 41.3 36.0 - 46.0 % Final   06/03/2024 39.6 36.0 - 46.0 % Final   06/28/2023 40.2 36.0 - 46.0 % Final   04/13/2023 39.4 36.0 - 46.0 % Final   07/18/2022 39.3 36.0 - 46.0 % Final     MCV   Date/Time Value Ref Range Status   11/22/2024 03:06 PM 82 80 - 100 fL Final   06/03/2024 09:49 AM 82 80 - 100 fL Final   06/28/2023 06:50 AM 81 80 - 100 fL Final   04/13/2023 11:25 AM 79 (L) 80 - 100 fL Final   07/18/2022 06:32 AM 81 80 - 100 fL Final     MCH   Date/Time Value Ref Range Status   11/22/2024 03:06 PM 25.9 (L) 26.0 - 34.0 pg Final   06/03/2024 09:49 AM 24.9 (L) 26.0 - 34.0 pg Final     MCHC   Date/Time Value Ref Range Status   11/22/2024 03:06 PM 31.7 (L) 32.0 - 36.0 g/dL Final   06/03/2024 09:49 AM 30.6 (L) 32.0 - 36.0 g/dL Final   06/28/2023 06:50 AM 30.1 (L) 32.0 - 36.0 g/dL Final   04/13/2023 11:25  "AM 30.2 (L) 32.0 - 36.0 g/dL Final   07/18/2022 06:32 AM 31.3 (L) 32.0 - 36.0 g/dL Final     RDW   Date/Time Value Ref Range Status   11/22/2024 03:06 PM 14.0 11.5 - 14.5 % Final   06/03/2024 09:49 AM 14.3 11.5 - 14.5 % Final   06/28/2023 06:50 AM 14.3 11.5 - 14.5 % Final   04/13/2023 11:25 AM 14.4 11.5 - 14.5 % Final   07/18/2022 06:32 AM 14.7 (H) 11.5 - 14.5 % Final     Platelets   Date/Time Value Ref Range Status   11/22/2024 03:06  150 - 450 x10*3/uL Final   06/03/2024 09:49  150 - 450 x10*3/uL Final   06/28/2023 06:50  150 - 450 x10E9/L Final   04/13/2023 11:25  150 - 450 x10E9/L Final   07/18/2022 06:32  150 - 450 x10E9/L Final     No results found for: \"MPV\"  Neutrophils %   Date/Time Value Ref Range Status   11/22/2024 03:06 PM 63.7 40.0 - 80.0 % Final   06/28/2023 06:50 AM 72.9 40.0 - 80.0 % Final   04/13/2023 11:25 AM 59.9 40.0 - 80.0 % Final   07/18/2022 06:32 AM 67.5 40.0 - 80.0 % Final     Immature Granulocytes %, Automated   Date/Time Value Ref Range Status   11/22/2024 03:06 PM 0.3 0.0 - 0.9 % Final     Comment:     Immature Granulocyte Count (IG) includes promyelocytes, myelocytes and metamyelocytes but does not include bands. Percent differential counts (%) should be interpreted in the context of the absolute cell counts (cells/UL).   06/28/2023 06:50 AM 0.3 0.0 - 0.9 % Final     Comment:      Immature Granulocyte Count (IG) includes promyelocytes,    myelocytes and metamyelocytes but does not include bands.   Percent differential counts (%) should be interpreted in the   context of the absolute cell counts (cells/L).   04/13/2023 11:25 AM 0.3 0.0 - 0.9 % Final     Comment:      Immature Granulocyte Count (IG) includes promyelocytes,    myelocytes and metamyelocytes but does not include bands.   Percent differential counts (%) should be interpreted in the   context of the absolute cell counts (cells/L).   07/18/2022 06:32 AM 0.3 0.0 - 0.9 % Final     Comment:      " Immature Granulocyte Count (IG) includes promyelocytes,    myelocytes and metamyelocytes but does not include bands.   Percent differential counts (%) should be interpreted in the   context of the absolute cell counts (cells/L).       Lymphocytes %   Date/Time Value Ref Range Status   11/22/2024 03:06 PM 27.9 13.0 - 44.0 % Final   06/28/2023 06:50 AM 19.2 13.0 - 44.0 % Final   04/13/2023 11:25 AM 30.3 13.0 - 44.0 % Final   07/18/2022 06:32 AM 23.4 13.0 - 44.0 % Final     Monocytes %   Date/Time Value Ref Range Status   11/22/2024 03:06 PM 6.3 2.0 - 10.0 % Final   06/28/2023 06:50 AM 5.9 2.0 - 10.0 % Final   04/13/2023 11:25 AM 7.4 2.0 - 10.0 % Final   07/18/2022 06:32 AM 6.3 2.0 - 10.0 % Final     Eosinophils %   Date/Time Value Ref Range Status   11/22/2024 03:06 PM 1.2 0.0 - 6.0 % Final   06/28/2023 06:50 AM 1.2 0.0 - 6.0 % Final   04/13/2023 11:25 AM 1.4 0.0 - 6.0 % Final   07/18/2022 06:32 AM 1.9 0.0 - 6.0 % Final     Basophils %   Date/Time Value Ref Range Status   11/22/2024 03:06 PM 0.6 0.0 - 2.0 % Final   06/28/2023 06:50 AM 0.5 0.0 - 2.0 % Final   04/13/2023 11:25 AM 0.7 0.0 - 2.0 % Final   07/18/2022 06:32 AM 0.6 0.0 - 2.0 % Final     Neutrophils Absolute   Date/Time Value Ref Range Status   11/22/2024 03:06 PM 6.99 1.20 - 7.70 x10*3/uL Final     Comment:     Percent differential counts (%) should be interpreted in the context of the absolute cell counts (cells/uL).   06/28/2023 06:50 AM 7.08 1.20 - 7.70 x10E9/L Final   04/13/2023 11:25 AM 4.59 1.20 - 7.70 x10E9/L Final   07/18/2022 06:32 AM 5.96 1.20 - 7.70 x10E9/L Final     Immature Granulocytes Absolute, Automated   Date/Time Value Ref Range Status   11/22/2024 03:06 PM 0.03 0.00 - 0.70 x10*3/uL Final     Lymphocytes Absolute   Date/Time Value Ref Range Status   11/22/2024 03:06 PM 3.06 1.20 - 4.80 x10*3/uL Final   06/28/2023 06:50 AM 1.86 1.20 - 4.80 x10E9/L Final   04/13/2023 11:25 AM 2.32 1.20 - 4.80 x10E9/L Final   07/18/2022 06:32 AM 2.07 1.20 -  4.80 x10E9/L Final     Monocytes Absolute   Date/Time Value Ref Range Status   11/22/2024 03:06 PM 0.69 0.10 - 1.00 x10*3/uL Final   06/28/2023 06:50 AM 0.57 0.10 - 1.00 x10E9/L Final   04/13/2023 11:25 AM 0.57 0.10 - 1.00 x10E9/L Final   07/18/2022 06:32 AM 0.56 0.10 - 1.00 x10E9/L Final     Eosinophils Absolute   Date/Time Value Ref Range Status   11/22/2024 03:06 PM 0.13 0.00 - 0.70 x10*3/uL Final   06/28/2023 06:50 AM 0.12 0.00 - 0.70 x10E9/L Final   04/13/2023 11:25 AM 0.11 0.00 - 0.70 x10E9/L Final   07/18/2022 06:32 AM 0.17 0.00 - 0.70 x10E9/L Final     Basophils Absolute   Date/Time Value Ref Range Status   11/22/2024 03:06 PM 0.07 0.00 - 0.10 x10*3/uL Final   06/28/2023 06:50 AM 0.05 0.00 - 0.10 x10E9/L Final   04/13/2023 11:25 AM 0.05 0.00 - 0.10 x10E9/L Final   07/18/2022 06:32 AM 0.05 0.00 - 0.10 x10E9/L Final       Assessment/Plan    Plan for one dose of IV iron Venofer 300mg) due to iron deficiency and inability to tolerate oral iron due to constipation. Patient has symptoms of significant fatigue and restless leg syndrome. She will monitor to see if symptoms improve. Patient no longer has menorrhagia. We discussed if iron deficiency reoccurs will recommend GI work up.  She is not anemic and her iron levels are adequate.  Her B12 level was low in June and she did not take supplements    Have ordered more labs to evaluate her restless leg syndrome which she initially refused but did do with encouragement.  Will call her with results and will order IV iron and B12 injections.   Will repeat labs in 3 months and see her back in 6 months         Diagnoses and all orders for this visit:  Iron deficiency  -     CBC and Auto Differential; Future  -     Ferritin; Future  -     Iron and TIBC; Future  -     CBC and Auto Differential; Future  -     Ferritin; Future  -     Iron and TIBC; Future  -     Vitamin B12; Future  -     Clinic Appointment Request Follow up; Future  -     Serum Protein Electrophoresis +  Immunofixation  -     [place]/Lambda Free Light Chain, Serum  -     Immunoglobulins (IgG, IgA, IgM)  -     HIV 1/2 Antigen/Antibody Screen with Reflex to Confirmation  -     Hepatitis C Antibody  -     Hepatitis B Surface Antibody  -     Hepatitis B Surface Antigen  -     Hepatitis B Core Antibody, Igm  -     Mar[place]-Barr Virus Antibody Panel  -     CMV IgG, IgM  Vitamin B12 deficiency  -     Vitamin B12           JOSE LUIS NievesC

## 2024-11-25 NOTE — PROGRESS NOTES
Pt seen in office today for a follow up visit with Lynn SHIELDS for management of her MICHELL.  She is  without complaints today and denies pain.     Medications, pharmacy preference and allergies were reviewed with patient and updated in the medical record .    Per orders, labs were obtained  prior to her visit. Results are to be reviewed today by PAC with patient during visit. She is to have additional labs in office today, labs again in 2 months and again in 6 months with a FUV thereafter.    Our contact information was given to patient and they were encouraged to contact us with any questions or concerns.     Patient verbalized understanding and agreement regarding discussed information via verbal feedback. Pt escorted to scheduling.

## 2024-11-26 LAB
KAPPA LC SERPL-MCNC: 1.99 MG/DL (ref 0.33–1.94)
KAPPA LC/LAMBDA SER: 1.21 {RATIO} (ref 0.26–1.65)
LAMBDA LC SERPL-MCNC: 1.64 MG/DL (ref 0.57–2.63)

## 2024-11-27 LAB
ALBUMIN: 3.9 G/DL (ref 3.4–5)
ALPHA 1 GLOBULIN: 0.3 G/DL (ref 0.2–0.6)
ALPHA 2 GLOBULIN: 0.9 G/DL (ref 0.4–1.1)
BETA GLOBULIN: 0.9 G/DL (ref 0.5–1.2)
CMV IGM SERPL-ACNC: <8 AU/ML
GAMMA GLOBULIN: 1.1 G/DL (ref 0.5–1.4)
IMMUNOFIXATION COMMENT: NORMAL
PATH REVIEW - SERUM IMMUNOFIXATION: NORMAL
PATH REVIEW-SERUM PROTEIN ELECTROPHORESIS: NORMAL
PROTEIN ELECTROPHORESIS COMMENT: NORMAL

## 2024-12-04 NOTE — PROGRESS NOTES
Patient had initially been seen for fatigue and concern her iron levels may be low due to fatigue and restless leg syndrome.    She had previously been found to have B12 deficiency and did not start B12 supplement.  She was advised at OV to start B12 supplement which she has not done.   WBC 11, hgb 13.1, plt 337, ferritan 65, iron 44, , % sat 12, B12 is 299, hepatitis B AB 26 (she doesn't know if she had hepatitis B vaccine or not) Marco Antonio Barr virus positive, early exposure, CMV positive  She is not anemic and iron levels are good.  She states insurance will not pay for IV iron.  She is not likely to get benefit from it, but would be willing to give it to her to see if it helps her symptoms if she wants it.  She declined three times and then wanted to know when it would be given.  End of call she will not pursue it  She was again advised to start B12 liquid SL every day 1000mcg.  She does not need B12 injections, she can use the liquid OTC  She will contact PCP in regard to vaccination history.  If she has been exposed to hepatitis B they may want to pursue  further evaluation and possible treatment  EBV typically presents with URI or cold like symptoms and can have associated fatigue which can be quite problematic.  It can contribute to anemia.  She states she was sick in September.  No treatment available, it typically gets better over 6 months  CMV can be associated with diarrhea which she has but she is sure it is related to another medication.   She thinks I have wasted her time and given her no answers.    I reviewed the above.  She will follow up with her PCP  Lynn Segal PA-C

## 2024-12-06 ENCOUNTER — TELEPHONE (OUTPATIENT)
Dept: HEMATOLOGY/ONCOLOGY | Facility: CLINIC | Age: 34
End: 2024-12-06
Payer: COMMERCIAL

## 2024-12-06 NOTE — TELEPHONE ENCOUNTER
Left message for patient. No concerning findings on labs  No need for iron. She should start B12 1000mcg daily  Reviewed viral studies. Of no concern.   Myeloma markers negative.

## 2024-12-10 ENCOUNTER — APPOINTMENT (OUTPATIENT)
Dept: HEMATOLOGY/ONCOLOGY | Facility: CLINIC | Age: 34
End: 2024-12-10
Payer: COMMERCIAL

## 2024-12-16 ENCOUNTER — PATIENT MESSAGE (OUTPATIENT)
Dept: ENDOCRINOLOGY | Facility: CLINIC | Age: 34
End: 2024-12-16

## 2024-12-16 ENCOUNTER — LAB (OUTPATIENT)
Dept: LAB | Facility: LAB | Age: 34
End: 2024-12-16
Payer: COMMERCIAL

## 2024-12-17 ENCOUNTER — APPOINTMENT (OUTPATIENT)
Dept: ENDOCRINOLOGY | Facility: CLINIC | Age: 34
End: 2024-12-17
Payer: COMMERCIAL

## 2024-12-17 VITALS
DIASTOLIC BLOOD PRESSURE: 99 MMHG | HEIGHT: 65 IN | BODY MASS INDEX: 48.82 KG/M2 | SYSTOLIC BLOOD PRESSURE: 165 MMHG | WEIGHT: 293 LBS | HEART RATE: 88 BPM

## 2024-12-17 DIAGNOSIS — E66.01 OBESITY, MORBID, BMI 50 OR HIGHER (MULTI): ICD-10-CM

## 2024-12-17 DIAGNOSIS — Z00.00 HEALTH CARE MAINTENANCE: ICD-10-CM

## 2024-12-17 DIAGNOSIS — E11.65 TYPE 2 DIABETES MELLITUS WITH HYPERGLYCEMIA, WITHOUT LONG-TERM CURRENT USE OF INSULIN: Primary | ICD-10-CM

## 2024-12-17 DIAGNOSIS — I10 HYPERTENSION, UNSPECIFIED TYPE: ICD-10-CM

## 2024-12-17 LAB — POC HEMOGLOBIN A1C: 9.1 % (ref 4.2–6.5)

## 2024-12-17 PROCEDURE — 83036 HEMOGLOBIN GLYCOSYLATED A1C: CPT | Performed by: NURSE PRACTITIONER

## 2024-12-17 PROCEDURE — 3008F BODY MASS INDEX DOCD: CPT | Performed by: NURSE PRACTITIONER

## 2024-12-17 PROCEDURE — 3077F SYST BP >= 140 MM HG: CPT | Performed by: NURSE PRACTITIONER

## 2024-12-17 PROCEDURE — 3080F DIAST BP >= 90 MM HG: CPT | Performed by: NURSE PRACTITIONER

## 2024-12-17 PROCEDURE — 3052F HG A1C>EQUAL 8.0%<EQUAL 9.0%: CPT | Performed by: NURSE PRACTITIONER

## 2024-12-17 PROCEDURE — 3049F LDL-C 100-129 MG/DL: CPT | Performed by: NURSE PRACTITIONER

## 2024-12-17 PROCEDURE — 99215 OFFICE O/P EST HI 40 MIN: CPT | Performed by: NURSE PRACTITIONER

## 2024-12-17 PROCEDURE — 1036F TOBACCO NON-USER: CPT | Performed by: NURSE PRACTITIONER

## 2024-12-17 ASSESSMENT — ENCOUNTER SYMPTOMS
OCCASIONAL FEELINGS OF UNSTEADINESS: 0
DEPRESSION: 1
LOSS OF SENSATION IN FEET: 1

## 2024-12-17 NOTE — Clinical Note
She is a tough one.  She really likes you and values your opinion.  Her BP was high again today.  I did not make changes because I was able to convince her to start Jardiance.  Fingers crossed.  She is coming around to the idea of a GLP1 but want to wait until after her gall bladder surgery.  She is hoping to have in the beginning of the year.  Discussed importance of getting her A1c down for surgery! Baby steps with her

## 2024-12-17 NOTE — PROGRESS NOTES
"Subjective   Arti Macias is a 34 y.o. female presents today for a follow up of DM Type 2. Initial diagnosis with diabetes was 12-15 year ago. (+) family history of diabetes in her dad.    Known complications include: obesity, HTN     Previously seeing Dr. Dowell through [facility].   Last visit with me 6/2023  A1c today 9.1%.  Previous A1c 8.4% in 6/2024   She has been on metformin since diagnosis  She also has struggled with weight her whole life   Highest weight 355-360 lbs.   Unable to tolerate phentermine due to mood, irritability and anxiety    Since last visit, she wanted to weight 3 months and see what A1c is  No plans for pregnancy   She has had a rough couple months  Went to [place]   Came back ill with double ear infections and bronchitis   Seeing hematology   Feeling frustrated   Has follow up with PCP in January   Overall not feeling well     Highest weight 350 lbs 3-5 years ago   Starting weight 313 lbs in March 2023  Weight in June 2023 293 lbs  Last office visit, weight was 316 lbs.    Today's weight 321 lbs      Current diabetes regimen is as follows:   Metformin  mg 2 pills twice daily      The patient is not currently checking the blood glucose - she is needlephobic    Hypoglycemia frequency: Denies  Hypoglycemia awareness: Yes     The patient comes into the office today with hyperglycemia and weight gain.       ROS  General: no fever, chills or acute changes in weight in the last 6 months  Skin: no rashes, pruritis or dry skin  Cardiac: denies chest pain, heart palpitations or orthopnea  Pulmonary: denies wheezing, productive cough or exertional dyspnea      Objective    Physical Exam  Blood pressure (!) 165/99, pulse 88, height 1.651 m (5' 5\"), weight 146 kg (321 lb).  General: not in acute distress, cooperative   Respiratory: normal respiratory effort  Musculoskeletal: normal gait       Current Outpatient Medications:     labetalol (Normodyne) 100 mg tablet, Take 1 tablet (100 mg) by mouth " every 12 hours., Disp: 180 tablet, Rfl: 3    metFORMIN XR (Glucophage-XR) 500 mg 24 hr tablet, Take 2 tablets (1,000 mg) by mouth 2 times daily (morning and late afternoon). Do not crush, chew, or split., Disp: 360 tablet, Rfl: 3    pediatric multivitamin tablet,chewable, Chew 1 tablet., Disp: , Rfl:     psyllium (Metamucil) 0.4 gram capsule, Take 2 capsules by mouth once daily., Disp: , Rfl:     Assessment/Plan   Type 2 diabetes with hyperglycemia without long term use insulin:   Obesity BMI >50 :   - A1c not at goal up from previous.  She wanted to give it 3 months.  Reinforced per ADA guidelines we could start insulin.  She is needle phobic and coming around to the idea of GLP1.  She is interested in Rybelsus but I am worried that she will have more GI side effects than once weekly injection.  Ideally we discussed Mounjaro would be the best option for her for A1c lowering, weight reduction.  She would like to reevaluate after her gall bladder surgery.  She is hoping to schedule into the new year.  Discussed importance and need to get her A1c down for surgery.  Today she agreed to start Jardiance.  Discussed side effects.  She asks about carnivore diet.  Discussed I would not advise this.  I would recommend modified protein with low carb diet.        Plan:   Start Jardiance 10 mg once daily in the morning    Continue metformin as is    A1c can be drawn in office (stop in) anytime on or after 3/17/2025  Follow up after 3/17 virtually and then 6 months in office         HTN:   BP not at goal.  BP elevated again today.  Will discuss with PCP     Plan:  Discuss with Dr. Araujo at upcoming appt

## 2024-12-17 NOTE — PATIENT INSTRUCTIONS
Start Jardiance 10 mg once daily in the morning      Continue metformin as is      A1c can be drawn in office (stop in) anytime on or after 3/17/2025    Follow up after 3/17 virtually and then 6 months in office

## 2024-12-18 ENCOUNTER — APPOINTMENT (OUTPATIENT)
Dept: HEMATOLOGY/ONCOLOGY | Facility: CLINIC | Age: 34
End: 2024-12-18
Payer: COMMERCIAL

## 2024-12-19 ENCOUNTER — TELEPHONE (OUTPATIENT)
Dept: ENDOCRINOLOGY | Facility: CLINIC | Age: 34
End: 2024-12-19
Payer: COMMERCIAL

## 2024-12-24 ENCOUNTER — APPOINTMENT (OUTPATIENT)
Dept: HEMATOLOGY/ONCOLOGY | Facility: CLINIC | Age: 34
End: 2024-12-24
Payer: COMMERCIAL

## 2024-12-30 ENCOUNTER — APPOINTMENT (OUTPATIENT)
Dept: HEMATOLOGY/ONCOLOGY | Facility: CLINIC | Age: 34
End: 2024-12-30
Payer: COMMERCIAL

## 2025-01-08 ENCOUNTER — APPOINTMENT (OUTPATIENT)
Dept: HEMATOLOGY/ONCOLOGY | Facility: CLINIC | Age: 35
End: 2025-01-08
Payer: COMMERCIAL

## 2025-01-14 ENCOUNTER — APPOINTMENT (OUTPATIENT)
Dept: PRIMARY CARE | Facility: CLINIC | Age: 35
End: 2025-01-14
Payer: COMMERCIAL

## 2025-01-14 VITALS
BODY MASS INDEX: 51.92 KG/M2 | DIASTOLIC BLOOD PRESSURE: 78 MMHG | SYSTOLIC BLOOD PRESSURE: 121 MMHG | HEART RATE: 87 BPM | OXYGEN SATURATION: 97 % | WEIGHT: 293 LBS

## 2025-01-14 DIAGNOSIS — E11.40 TYPE 2 DIABETES MELLITUS WITH DIABETIC NEUROPATHY, WITHOUT LONG-TERM CURRENT USE OF INSULIN: Primary | ICD-10-CM

## 2025-01-14 DIAGNOSIS — R10.13 EPIGASTRIC PAIN: ICD-10-CM

## 2025-01-14 DIAGNOSIS — H65.93 BILATERAL OTITIS MEDIA WITH EFFUSION: ICD-10-CM

## 2025-01-14 PROCEDURE — 99215 OFFICE O/P EST HI 40 MIN: CPT | Performed by: INTERNAL MEDICINE

## 2025-01-14 PROCEDURE — 1036F TOBACCO NON-USER: CPT | Performed by: INTERNAL MEDICINE

## 2025-01-14 PROCEDURE — 3078F DIAST BP <80 MM HG: CPT | Performed by: INTERNAL MEDICINE

## 2025-01-14 PROCEDURE — 3074F SYST BP LT 130 MM HG: CPT | Performed by: INTERNAL MEDICINE

## 2025-01-14 RX ORDER — AZELASTINE 1 MG/ML
1 SPRAY, METERED NASAL 2 TIMES DAILY
Qty: 30 ML | Refills: 12 | Status: SHIPPED | OUTPATIENT
Start: 2025-01-14 | End: 2026-01-14

## 2025-01-14 ASSESSMENT — PAIN SCALES - GENERAL: PAINLEVEL_OUTOF10: 0-NO PAIN

## 2025-01-14 NOTE — PROGRESS NOTES
Subjective   Patient ID: Arti Macias is a 34 y.o. female who presents for Follow-up.  HPI  34-year-old female here for follow-up visit, last seen in September for preventive care visit.  At that time there was concern regarding right upper quadrant p pain, ultrasound showing fatty liver with gallstones.  She was subsequently seen by Dr. Carranza recommended cholecystectomy for which she is precontemplative, plans to start after better glucose contorl     Has always felt a chronic cramping sensation in her left epigastrium, started over the last several months has noted exacerbation with protein intake was so severe at one point in November where she nearly presented to the ED, resolved after bowel movement. Now experiencing intermittent cramping/stabbing epigastric discomfort on the left that ranges from 20 minutes to days without radiation or associated symptoms, takes ibuprofen with some improvement. No surgeries in that area. No nausea, vomiting, constutional symptoms, bowel movements are unchnaged, no blood in the stool.    Otitis media nearly 3 weeks ago treated at walk in clinic. Presented with left sided facial heat along with head pain radiation to the parieto-occipital area, treated with 10 day  course of augmentin recommended Nasacort spray (missed a few days) and zyrtec (which she did not do had bad reaction in the past as if bugs crawling on her). Chronic issues with TMJ had an ear infection in September.     - DM2  - with neuropathy A1C worse at 9.1 % diagnosed at 16, type 2. Takes metformin 1000mg BID followed by endocrinology, goal <6.5% prior to fertility treatment, precontemplative. not interested in further medication or injection management.  Recently started Jardiance by Wendi  Has lost weight since last being seen in the setting of  being on a diet. Is on a more low carb carnivore diet. PAT testing will be deferred due to elevated A1C.     PMHx:  - JACKIE - chronic - previously on  several medications discontinued in adulthood, recommended therapy declined medical management  - RLS/ Iron deficiency - on iron supplement every other day. Seen by Bina inman recommend EGD and colonoscopy not yet seen, seen by heme started venofer with significant improvement in symptoms, though now has had to resume compression stockings.  B12 levels borderline low recommended supplementation  - Class 3 obesity -  previously on phentermine  - HTN  - on labetolol trying to conceive   - Gallstones for the past 10 years. not an issue for her last flare over 20 years ago. Gets intermittent flares  - Sleep disorder referred to sleep medicine unable to afford to be seen.   - Lower extremity edema thought secondary to venous insufficiency   - Family of breast cancer and dense breasts - seen at breast at [facility] recommended q6m exams   - NAFLD -noted on ultrasound     Social:   - Lives at home with ,  at [facility]   - no children, goal is for her to have a child   - Sisters with Annamaria Bhardwaj, a nurse.    Current Outpatient Medications   Medication Instructions    azelastine (Astelin) 137 mcg (0.1 %) nasal spray 1 spray, Each Nostril, 2 times daily, Use in each nostril as directed    empagliflozin (JARDIANCE) 10 mg, oral, Daily    labetalol (NORMODYNE) 100 mg, oral, Every 12 hours    metFORMIN XR (GLUCOPHAGE-XR) 1,000 mg, oral, 2 times daily (morning and late afternoon), Do not crush, chew, or split.    pediatric multivitamin tablet,chewable 1 tablet    psyllium (Metamucil) 0.4 gram capsule 2 capsules, Daily        Objective     /78   Pulse 87   Wt 142 kg (312 lb)   LMP 12/28/2024   SpO2 97%   BMI 51.92 kg/m²     Physical Exam  General: Alert and oriented, in no apparent distress   HEENT: No conjunctival erythema, no external facial lesions   Lungs: Breathing comfortably  Skin: No evidence of skin breakdown.  Neuro: AAO x 3, answering questions appropriately, no obvious cranial nerve deficits    Abdominal exam: reproducible tenderness at left epigastrium without rebound or guarding, reproducible right upper quadrant tenderness. No overlying skin changes no asymmetry  Lab Results   Component Value Date    WBC 11.0 11/22/2024    HGB 13.1 11/22/2024    HCT 41.3 11/22/2024     11/22/2024    CHOL 197 06/03/2024    TRIG 280 (H) 06/03/2024    HDL 41.0 06/03/2024    ALT 14 06/03/2024    AST 11 06/03/2024     06/03/2024    K 4.5 06/03/2024     06/03/2024    CREATININE 0.71 06/03/2024    BUN 10 06/03/2024    CO2 26 06/03/2024    TSH 1.32 06/03/2024    HGBA1C 9.1 (A) 12/17/2024     Independently reviewed most recent bloodwork        Assessment/Plan     Assessment & Plan  Type 2 diabetes mellitus with diabetic neuropathy, without long-term current use of insulin  Worsening of A1c working on lifestyle modifications with recent institution of Jardiance overall tolerating well.   Not interested in GLP-1 agonist until after resolved symptomatic cholelithiasis which is reasonable, though may be delayed due to worsening A1C. Continue aggressive lifestyle modification, may need to resort to alternate treatment modalities at least in the short term until able to get her A1C under control again.   Orders:    Follow Up In [facility] - PCP - Established; Future    Epigastric pain  Uncertain etiology, recommend consideration for CT for further evaluation.   Reports longstanding, wishes to hold off on this for now.   Ddx include GI (dyspepsia/gastritis, GERD) less likely CV, not consistent with known biliary colic        Bilateral otitis media with effusion  Recurrent, taking flonase, intolerant to oral H2 blocker, add nasal azelastine to management. Try intranasal saline irrigation and minimal course of afrin. No e/o infection.   Orders:    azelastine (Astelin) 137 mcg (0.1 %) nasal spray; Administer 1 spray into each nostril 2 times a day. Use in each nostril as directed      Health  Maintenance  Cancer screening:   - Pap due 2025 seen by Dr. Hernández   - Mammogram + ultrasound stable left breast mass follow-up with high risk breast specialist appt scheduled Monday.  Immunizations: declines all     Followup 3 months

## 2025-01-14 NOTE — ASSESSMENT & PLAN NOTE
Worsening of A1c working on lifestyle modifications with recent institution of Jardiance overall tolerating well.   Not interested in GLP-1 agonist until after resolved symptomatic cholelithiasis which is reasonable, though may be delayed due to worsening A1C. Continue aggressive lifestyle modification, may need to resort to alternate treatment modalities at least in the short term until able to get her A1C under control again.   Orders:    Follow Up In [facility] - PCP - Established; Future

## 2025-01-14 NOTE — PATIENT INSTRUCTIONS
Arti,   Try magnesium glycinate 400mg in the evening 1-2 hours before bed.   Fluid in the ears   Nasal saline irrigation with Netti-Pot or [place] Med Sinus rinse - use distilled or boiled water to use   Flonase   Nasal azelastine   Afrin for 3 days maximum   Let me know if belly pain persists and we can consider a CT scan.    Followup 3 months

## 2025-01-18 NOTE — PROGRESS NOTES
"    Arti Macias female   1990 34 y.o.   [id number]      Chief Complaint    New Patient Visit          [facility]  Arti Macias is a 34 y.o.  female [facility] by Ana [facility]jo DO [facility] breast [facility] and family history of breast cancer. She has history of breast aspiration vs biopsy which was benign. She has family history of breast cancer in paternal grandmother in her 70s.    She is Type 2 diabetic and would need SAM to conceive. She is \"okay\" if she does not have children.    BREAST IMAGIN2024 bilateral diagnostic mammogram and left breast ultrasound, BI-RADS Category 3, left breast 2:00 2cm from nipple 0.8 x 0.3 x 0.7cm probable benign breast mass, 6 month follow up left breast ultrasound is recommended. 2024 left beast ultrasound, BI-RADS Category 3, stable left breast mass.     REPRODUCTIVE HISTORY: menarche age 11, nullipara, OCPs, premenopausal, no HRT, fatty breast tissue dense tissue    FAMILY CANCER HISTORY:   Father: prostate cancer  Paternal grandfather: prostate cancer  Paternal grandmother: breast cancer in her 70s, pancreatic cancer    REVIEW OF SYSTEMS    Constitutional:  Negative for appetite change, fatigue, fever and unexpected weight change.   HENT:  Negative for ear pain, hearing loss, nosebleeds, sore throat and trouble swallowing.    Eyes:  Negative for discharge, itching and visual disturbance.   Respiratory:  Negative for cough, chest tightness and shortness of breath.    Cardiovascular:  Negative for chest pain, palpitations and leg swelling.   Breast: as indicated in HPI  Gastrointestinal:  Negative for abdominal pain, constipation, diarrhea and nausea.   Endocrine: Negative for cold intolerance and heat intolerance.   Genitourinary:  Negative for dysuria, frequency, hematuria, pelvic pain and vaginal bleeding.   Musculoskeletal:  Negative for arthralgias, back pain, gait problem, joint swelling and myalgias.   Skin:  Negative for " color change and rash.   Allergic/Immunologic: Negative for environmental allergies and food allergies.   Neurological:  Negative for dizziness, tremors, speech difficulty, weakness, numbness and headaches.   Hematological:  Does not bruise/bleed easily.   Psychiatric/Behavioral:  Negative for agitation, dysphoric mood and sleep disturbance. The patient is not nervous/anxious.         MEDICATIONS  Current Outpatient Medications   Medication Instructions    azelastine (Astelin) 137 mcg (0.1 %) nasal spray 1 spray, Each Nostril, 2 times daily, Use in each nostril as directed    empagliflozin (JARDIANCE) 10 mg, oral, Daily    labetalol (NORMODYNE) 100 mg, oral, Every 12 hours    metFORMIN XR (GLUCOPHAGE-XR) 1,000 mg, oral, 2 times daily (morning and late afternoon), Do not crush, chew, or split.    pediatric multivitamin tablet,chewable 1 tablet    psyllium (Metamucil) 0.4 gram capsule 2 capsules, Daily        ALLERGIES  Allergies   Allergen Reactions    Lisinopril Cough    Lovastatin Myalgia        Patient Active Problem List    Diagnosis Date Noted    Type 2 diabetes mellitus with diabetic neuropathy, without long-term current use of insulin 01/14/2025    Vitamin B12 deficiency 11/25/2024    Restless legs 06/10/2024    Fatigue 06/10/2024    Iron deficiency 06/06/2024    Irregular menses 08/23/2023    Mixed hyperlipidemia 08/23/2023    Left flank pain 04/04/2023    Gallstones 03/07/2023    Acquired short Achilles tendon 02/18/2023    Anxiety 02/18/2023    Female infertility 02/18/2023    Hypertension 02/18/2023    Obstructive sleep apnea 02/18/2023    Oligomenorrhea 02/18/2023    Paresthesia of both feet 02/18/2023    Plantar fasciitis 02/18/2023    Polyphagia 02/18/2023    Pronation of both feet 02/18/2023    Obesity, morbid, BMI 50 or higher (Multi) 02/18/2023    Type 2 diabetes mellitus with hyperglycemia, without long-term current use of insulin 02/18/2023    Fatty liver 06/15/2020    DUB (dysfunctional uterine  bleeding) 09/11/2012    GERD (gastroesophageal reflux disease) 01/13/2010    ADHD, predominantly inattentive type 10/04/2004     Past Medical History:   Diagnosis Date    Allergic     Anemia     Anxiety     Bronchitis 09/08/2024    Diabetes mellitus (Multi)     Female infertility     GERD (gastroesophageal reflux disease)     Hypertension     Personal history of diseases of the skin and subcutaneous tissue     History of sebaceous cyst    Personal history of other diseases of the circulatory system 10/30/2019    History of hypertension    Personal history of other diseases of the digestive system     History of cholelithiasis    Personal history of other endocrine, nutritional and metabolic disease     History of diabetes mellitus    Polycystic ovary syndrome       Past Surgical History:   Procedure Laterality Date    BREAST BIOPSY      WISDOM TOOTH EXTRACTION  2021      Family History   Problem Relation Name Age of Onset    Anesthesia related problems Mother Cannot had lidacaine     Hyperlipidemia Mother Cannot had lidacaine     Other (Prediabetes) Mother Cannot had lidacaine     Diabetes Father Pedrito     Prostate cancer Father Pedrito     Hypertension Father Pedrito     Alcohol abuse Father Pedrito     Raynaud syndrome Sister      Heart attack Father's Brother      Diabetes Maternal Grandfather Amari     Breast cancer Paternal Grandmother      Diabetes Paternal Grandfather Yash     Prostate cancer Paternal Grandfather Yash     Other (pacemaker) Paternal Grandfather Yash           SOCIAL HISTORY      Social History     Tobacco Use    Smoking status: Never     Passive exposure: Never    Smokeless tobacco: Never   Substance Use Topics    Alcohol use: Not Currently     Comment: A couple times a year        VITALS  Vitals:    01/20/25 1502   BP: 154/82   Pulse: 96   Temp: 36.7 °C (98 °F)   SpO2: 98%        PHYSICAL EXAM  Patient is alert and oriented x3, with appropriate mood. The gait is steady and hand grasps are equal.  Sclera clear. The breasts are asymmetrical, left larger. The tissue is soft and fatty without palpable abnormalities, discrete nodules or masses. The skin and nipples appear normal. There is no cervical, supraclavicular, or axillary lymphadenopathy palpable. Heart rate and rhythm normal, S1 and S2 appreciated. The lungs are clear bilaterally. Abdomen is soft & non-tender.    Physical Exam     IMAGING  Bilateral diagnostic mammogram and left breast ultrasound, BI-RADS Category 3, stable probable benign left breast mass.     Time was spent viewing digital images of the radiology testing with the patient.     RISK PROFILE      ORDERS  Orders Placed This Encounter   Procedures    BI mammo bilateral diagnostic tomosynthesis     Standing Status:   Future     Standing Expiration Date:   3/20/2026     Order Specific Question:   Perform a breast ultrasound if clinically indicated by Radiologist?     Answer:   Yes     Order Specific Question:   Previous Mamm performed at  location?     Answer:   Yes     Order Specific Question:   Reason for exam:     Answer:   .     Order Specific Question:   Radiologist to Determine Optimal Study     Answer:   Yes     Order Specific Question:   Release result to [facility] [employer]     Answer:   Immediate [1]     Order Specific Question:   Is this exam part of a Research Study? If Yes, link this order to the research study     Answer:   No    BI US breast limited left     Standing Status:   Future     Standing Expiration Date:   3/20/2026     Order Specific Question:   Reason for exam:     Answer:   .     Order Specific Question:   Radiologist to Determine Optimal Study     Answer:   Yes     Order Specific Question:   Release result to [employer]     Answer:   Immediate [1]     Order Specific Question:   Is this exam part of a Research Study? If Yes, link this order to the research study     Answer:   No          ASSESSMENT/PLAN  1. Abnormal finding on breast imaging  BI mammo bilateral diagnostic  tomosynthesis    BI US breast limited left      2. Subareolar mass of left breast               Follow up in about 1 year (around 1/20/2026) for with or after recommended imaging.      Magnolia Lam, [facility]

## 2025-01-20 ENCOUNTER — HOSPITAL ENCOUNTER (OUTPATIENT)
Dept: RADIOLOGY | Facility: CLINIC | Age: 35
Discharge: HOME | End: 2025-01-20
Payer: COMMERCIAL

## 2025-01-20 ENCOUNTER — OFFICE VISIT (OUTPATIENT)
Dept: SURGICAL ONCOLOGY | Facility: CLINIC | Age: 35
End: 2025-01-20
Payer: COMMERCIAL

## 2025-01-20 VITALS — BODY MASS INDEX: 48.82 KG/M2 | HEIGHT: 65 IN | WEIGHT: 293 LBS

## 2025-01-20 VITALS
HEART RATE: 96 BPM | SYSTOLIC BLOOD PRESSURE: 154 MMHG | TEMPERATURE: 98 F | OXYGEN SATURATION: 98 % | DIASTOLIC BLOOD PRESSURE: 82 MMHG

## 2025-01-20 DIAGNOSIS — R92.8 OTHER ABNORMAL AND INCONCLUSIVE FINDINGS ON DIAGNOSTIC IMAGING OF BREAST: ICD-10-CM

## 2025-01-20 DIAGNOSIS — N63.42 SUBAREOLAR MASS OF LEFT BREAST: ICD-10-CM

## 2025-01-20 DIAGNOSIS — R92.8 ABNORMAL FINDING ON BREAST IMAGING: Primary | ICD-10-CM

## 2025-01-20 PROCEDURE — 77066 DX MAMMO INCL CAD BI: CPT

## 2025-01-20 PROCEDURE — 99214 OFFICE O/P EST MOD 30 MIN: CPT | Performed by: NURSE PRACTITIONER

## 2025-01-20 PROCEDURE — 1036F TOBACCO NON-USER: CPT | Performed by: NURSE PRACTITIONER

## 2025-01-20 PROCEDURE — 76642 ULTRASOUND BREAST LIMITED: CPT | Mod: LEFT SIDE | Performed by: STUDENT IN AN ORGANIZED HEALTH CARE EDUCATION/TRAINING PROGRAM

## 2025-01-20 PROCEDURE — 99204 OFFICE O/P NEW MOD 45 MIN: CPT | Performed by: NURSE PRACTITIONER

## 2025-01-20 PROCEDURE — 76642 ULTRASOUND BREAST LIMITED: CPT | Mod: LT

## 2025-01-20 PROCEDURE — 76982 USE 1ST TARGET LESION: CPT | Mod: LT

## 2025-01-20 PROCEDURE — 77062 BREAST TOMOSYNTHESIS BI: CPT | Mod: LEFT SIDE | Performed by: STUDENT IN AN ORGANIZED HEALTH CARE EDUCATION/TRAINING PROGRAM

## 2025-01-20 PROCEDURE — 3079F DIAST BP 80-89 MM HG: CPT | Performed by: NURSE PRACTITIONER

## 2025-01-20 PROCEDURE — 77066 DX MAMMO INCL CAD BI: CPT | Mod: LEFT SIDE | Performed by: STUDENT IN AN ORGANIZED HEALTH CARE EDUCATION/TRAINING PROGRAM

## 2025-01-20 PROCEDURE — 3077F SYST BP >= 140 MM HG: CPT | Performed by: NURSE PRACTITIONER

## 2025-01-20 ASSESSMENT — PAIN SCALES - GENERAL: PAINLEVEL_OUTOF10: 0-NO PAIN

## 2025-01-21 ENCOUNTER — APPOINTMENT (OUTPATIENT)
Dept: GASTROENTEROLOGY | Facility: CLINIC | Age: 35
End: 2025-01-21
Payer: COMMERCIAL

## 2025-02-06 ENCOUNTER — PATIENT MESSAGE (OUTPATIENT)
Dept: ENDOCRINOLOGY | Facility: CLINIC | Age: 35
End: 2025-02-06
Payer: COMMERCIAL

## 2025-03-02 DIAGNOSIS — I10 PRIMARY HYPERTENSION: ICD-10-CM

## 2025-03-03 RX ORDER — LABETALOL 100 MG/1
100 TABLET, FILM COATED ORAL EVERY 12 HOURS
Qty: 60 TABLET | Refills: 2 | Status: SHIPPED | OUTPATIENT
Start: 2025-03-03

## 2025-03-04 ENCOUNTER — APPOINTMENT (OUTPATIENT)
Dept: HEMATOLOGY/ONCOLOGY | Facility: CLINIC | Age: 35
End: 2025-03-04
Payer: COMMERCIAL

## 2025-03-19 LAB — POC HEMOGLOBIN A1C: 8 % (ref 4.2–6.5)

## 2025-03-20 ENCOUNTER — APPOINTMENT (OUTPATIENT)
Dept: ENDOCRINOLOGY | Facility: CLINIC | Age: 35
End: 2025-03-20
Payer: COMMERCIAL

## 2025-03-20 ENCOUNTER — TELEPHONE (OUTPATIENT)
Dept: ENDOCRINOLOGY | Facility: CLINIC | Age: 35
End: 2025-03-20

## 2025-03-20 VITALS — WEIGHT: 293 LBS | BODY MASS INDEX: 48.82 KG/M2 | HEIGHT: 65 IN

## 2025-03-20 DIAGNOSIS — E66.01 OBESITY, CLASS III, BMI 40-49.9 (MORBID OBESITY) (MULTI): ICD-10-CM

## 2025-03-20 DIAGNOSIS — E11.65 TYPE 2 DIABETES MELLITUS WITH HYPERGLYCEMIA, WITHOUT LONG-TERM CURRENT USE OF INSULIN: Primary | ICD-10-CM

## 2025-03-20 PROCEDURE — 99214 OFFICE O/P EST MOD 30 MIN: CPT | Performed by: NURSE PRACTITIONER

## 2025-03-20 PROCEDURE — 1036F TOBACCO NON-USER: CPT | Performed by: NURSE PRACTITIONER

## 2025-03-20 PROCEDURE — 3008F BODY MASS INDEX DOCD: CPT | Performed by: NURSE PRACTITIONER

## 2025-03-20 PROCEDURE — 83036 HEMOGLOBIN GLYCOSYLATED A1C: CPT | Performed by: NURSE PRACTITIONER

## 2025-03-20 RX ORDER — ALBUTEROL SULFATE 90 UG/1
2 INHALANT RESPIRATORY (INHALATION) EVERY 6 HOURS PRN
COMMUNITY

## 2025-03-20 ASSESSMENT — ENCOUNTER SYMPTOMS
LOSS OF SENSATION IN FEET: 0
DEPRESSION: 0
OCCASIONAL FEELINGS OF UNSTEADINESS: 0

## 2025-03-20 ASSESSMENT — PATIENT HEALTH QUESTIONNAIRE - PHQ9
SUM OF ALL RESPONSES TO PHQ9 QUESTIONS 1 AND 2: 0
2. FEELING DOWN, DEPRESSED OR HOPELESS: NOT AT ALL
1. LITTLE INTEREST OR PLEASURE IN DOING THINGS: NOT AT ALL

## 2025-03-20 NOTE — TELEPHONE ENCOUNTER
This nurse called the patient to confirm the virtual appointment scheduled for today and to conduct the rooming process. The patient was in a work meeting and requested that I try to call her back in 15 minutes.

## 2025-03-20 NOTE — PROGRESS NOTES
"Subjective   Arti Macias is a 34 y.o. female presents today for a follow up of DM Type 2. Initial diagnosis with diabetes was 12-15 year ago. (+) family history of diabetes in her dad.    Known complications include: obesity, HTN     Previously seeing Dr. Dowell through [facility].   Last visit with me 12/2024  A1c 8% yesterday.  Previous A1c 9.1% in 12/2024.   She has been on metformin since diagnosis  She also has struggled with weight her whole life   Highest weight 355-360 lbs.   Unable to tolerate phentermine due to mood, irritability and anxiety    Since last visit, she was able to start jardiance  Initially had increased urination but has improved    Highest weight 350 lbs 3-5 years ago   Starting weight 313 lbs in March 2023  Weight in June 2023 293 lbs  Last documented weight was 313 lbs.   Today 296 lb on home scale.    Has lost 25 lbs since December.    Is checking BP at home.  Readings have been 120//90  sometimes up to 150/91      Current diabetes regimen is as follows:   Metformin  mg 2 pills twice daily  Jardiance 10 mg once daily     The patient is not currently checking the blood glucose - she is needlephobic    Hypoglycemia frequency: Denies  Hypoglycemia awareness: Yes     The patient comes into the office today with wanting to lose weight and continue to get A1c to goal.       ROS  General: no fever, chills.  Weight see HPI   Skin: no rashes, pruritis or dry skin  Cardiac: denies chest pain, heart palpitations or orthopnea  Pulmonary: denies wheezing, productive cough or exertional dyspnea      Objective    Physical Exam  Height 1.651 m (5' 5\"), weight 135 kg (296 lb 11.2 oz), last menstrual period 03/20/2025, not currently breastfeeding.  General Appearance: Well appearing, alert, in no acute distress, well-hydrated, well nourished.  Affect: alert, cooperative         Current Outpatient Medications:     azelastine (Astelin) 137 mcg (0.1 %) nasal spray, Administer 1 spray into each " nostril 2 times a day. Use in each nostril as directed, Disp: 30 mL, Rfl: 12    empagliflozin (Jardiance) 10 mg, Take 1 tablet (10 mg) by mouth once daily., Disp: 90 tablet, Rfl: 3    labetalol (Normodyne) 100 mg tablet, TAKE 1 TABLET BY MOUTH EVERY 12 HOURS, Disp: 60 tablet, Rfl: 2    metFORMIN XR (Glucophage-XR) 500 mg 24 hr tablet, Take 2 tablets (1,000 mg) by mouth 2 times daily (morning and late afternoon). Do not crush, chew, or split., Disp: 360 tablet, Rfl: 3    pediatric multivitamin tablet,chewable, Chew 1 tablet., Disp: , Rfl:     psyllium (Metamucil) 0.4 gram capsule, Take 2 capsules by mouth once daily., Disp: , Rfl:     Assessment/Plan   Type 2 diabetes with hyperglycemia without long term use insulin:   Obesity BMI >40 :   BMI 49:  - A1c not at goal but improving since starting jardiance.  Will increase jardiance today.  She is tolerating well.  She has also made adjustments to her diet.  Her  is eating low carb so she is trying too.  She has lost about 25 lbs in the last 3 months.  I congratulated her on this today.  I would recommend modified protein with low carb diet and increase her exercise.  Could consider synjardy to lessen her pill burden       Plan:   Change Jardiance 25 mg once daily in the morning    Continue metformin as is    Continue weight loss efforts with changes to diet and exercise   Follow up in June and Sept

## 2025-03-20 NOTE — TELEPHONE ENCOUNTER
This nurse called the patient to confirm the virtual appointment scheduled for today and to conduct the rooming process. The patient verbalized understanding of the instructions provided and was appreciative of the call.

## 2025-03-25 ENCOUNTER — DOCUMENTATION (OUTPATIENT)
Dept: HEMATOLOGY/ONCOLOGY | Facility: CLINIC | Age: 35
End: 2025-03-25
Payer: COMMERCIAL

## 2025-03-25 NOTE — PROGRESS NOTES
Labs to have been completed in February  Have checked 5 times and not completed  Will address at OV  Lynn Segal PA-C

## 2025-04-23 ENCOUNTER — APPOINTMENT (OUTPATIENT)
Dept: PRIMARY CARE | Facility: CLINIC | Age: 35
End: 2025-04-23
Payer: COMMERCIAL

## 2025-04-23 VITALS
HEART RATE: 81 BPM | WEIGHT: 293 LBS | OXYGEN SATURATION: 98 % | HEIGHT: 65 IN | TEMPERATURE: 96.8 F | SYSTOLIC BLOOD PRESSURE: 132 MMHG | DIASTOLIC BLOOD PRESSURE: 66 MMHG | BODY MASS INDEX: 48.82 KG/M2

## 2025-04-23 DIAGNOSIS — J30.9 ALLERGIC RHINITIS, UNSPECIFIED SEASONALITY, UNSPECIFIED TRIGGER: ICD-10-CM

## 2025-04-23 DIAGNOSIS — E11.40 TYPE 2 DIABETES MELLITUS WITH DIABETIC NEUROPATHY, WITHOUT LONG-TERM CURRENT USE OF INSULIN: ICD-10-CM

## 2025-04-23 DIAGNOSIS — E61.1 IRON DEFICIENCY: ICD-10-CM

## 2025-04-23 DIAGNOSIS — R06.2 WHEEZING: Primary | ICD-10-CM

## 2025-04-23 DIAGNOSIS — G47.33 OSA (OBSTRUCTIVE SLEEP APNEA): ICD-10-CM

## 2025-04-23 PROCEDURE — 99214 OFFICE O/P EST MOD 30 MIN: CPT | Performed by: INTERNAL MEDICINE

## 2025-04-23 PROCEDURE — 3008F BODY MASS INDEX DOCD: CPT | Performed by: INTERNAL MEDICINE

## 2025-04-23 PROCEDURE — 3052F HG A1C>EQUAL 8.0%<EQUAL 9.0%: CPT | Performed by: INTERNAL MEDICINE

## 2025-04-23 PROCEDURE — 3075F SYST BP GE 130 - 139MM HG: CPT | Performed by: INTERNAL MEDICINE

## 2025-04-23 PROCEDURE — 3078F DIAST BP <80 MM HG: CPT | Performed by: INTERNAL MEDICINE

## 2025-04-23 PROCEDURE — 1036F TOBACCO NON-USER: CPT | Performed by: INTERNAL MEDICINE

## 2025-04-23 ASSESSMENT — PAIN SCALES - GENERAL: PAINLEVEL_OUTOF10: 0-NO PAIN

## 2025-04-23 NOTE — PROGRESS NOTES
Subjective   Patient ID: Arti Macias is a 34 y.o. female who presents for No chief complaint on file..  History of Present Illness  34-year-old female here for follow-up visit, last seen in January.  At the time there was concern regarding epigastric pain and declined additional workup, has since improved believes gas related.       They experience frequent colds and severe wheezing, particularly in March, necessitating the use of an albuterol inhaler. The wheezing is described as so severe that they 'can't breathe.' They have not been formally diagnosed with asthma. Symptoms are often triggered by allergies, despite having air purifiers and a humidifier at home.    They are concerned about frequent illnesses, especially with an upcoming cruise where they anticipate exposure to unfamiliar weather conditions. They are considering starting an allergy pill due to frequent sneezing, particularly when pollen levels are high. They have previously used Flonase nasal spray but found it ineffective and prefer oral medications like Zyrtec, Allegra, or Claritin.    They have a history of abdominal pain, believed to be related to gas and dietary habits. They report significant weight loss from 321 pounds in December to 293 pounds currently, attributing this to medication adjustments, including an increased dose of Jardiance.    They have a history of iron deficiency, having received an iron infusion last year. They currently take a multivitamin with extra iron and are concerned about hair thinning, which they associate with iron levels. They are considering taking a hair, skin, and nails supplement.    They mention a past diagnosis of sleep apnea over ten years ago and are interested in undergoing a home sleep test due to persistent fatigue and their 's observations of their sleep patterns. They have previously used a CPAP machine.    They have a history of breast concerns, having undergone a mammogram and  "ultrasound in January, which showed a stable, probably benign mass. They are anxious about the follow-up schedule and insurance coverage for these tests.    PMHx:  - DM2 - age 16, neuropathy 9.1% on metformin 1000 twice daily and Jardiance followed by endocrinology needs optimization prior to fertility treatment  - JACKIE - chronic - previously on several medications discontinued in adulthood, recommended therapy declined medical management  - RLS/ Iron deficiency - on iron supplement every other day. Seen by Bina inman recommend EGD and colonoscopy not yet seen, seen by rosmery ernst venofer with significant improvement in symptoms, though now has had to resume compression stockings.  B12 levels borderline low recommended supplementation  - Class 3 obesity -  previously on phentermine  - HTN  - on labetolol trying to conceive   - Gallstones for the past 10 years. not an issue for her last flare over 20 years ago. Gets intermittent flares  - SHAKEEL - diagnosed over 10 years ago interested in retesting.   - Lower extremity edema thought secondary to venous insufficiency   - Family of breast cancer and dense breasts - seen at breast at [facility] recommended q6m exams   - NAFLD -noted on ultrasound    Social:   - Lives at home with ,  at [facility]   - no children, goal is for her to have a child   - Sisters with Annamaria Bhardwaj, a nurse.      PMHx, FHx, Social Hx, Surg Hx personally reviewed at this appointment. No pertinent findings and/or changes from prior (if applicable).      Objective     /66   Pulse 81   Temp 36 °C (96.8 °F)   Ht 1.651 m (5' 5\")   Wt 133 kg (293 lb)   SpO2 98%   BMI 48.76 kg/m²    General: Alert and oriented, in no apparent distress   HEENT: No conjunctival erythema, no external facial lesions   Lungs: Breathing comfortably  Skin: No evidence of skin breakdown.  Neuro: AAO x 3, answering questions appropriately, no obvious cranial nerve deficits   Breast exam: No palpable lumps, " no discharge, no noted axillary lymphadenopathy. Chaperone offered and declined     Lab Results   Component Value Date    WBC 11.0 11/22/2024    HGB 13.1 11/22/2024    HCT 41.3 11/22/2024     11/22/2024    CHOL 197 06/03/2024    TRIG 280 (H) 06/03/2024    HDL 41.0 06/03/2024    ALT 14 06/03/2024    AST 11 06/03/2024     06/03/2024    K 4.5 06/03/2024     06/03/2024    CREATININE 0.71 06/03/2024    BUN 10 06/03/2024    CO2 26 06/03/2024    TSH 1.32 06/03/2024    HGBA1C 8.0 (A) 03/19/2025         Current Outpatient Medications   Medication Instructions    albuterol 90 mcg/actuation inhaler 2 puffs, inhalation, Every 6 hours PRN    azelastine (Astelin) 137 mcg (0.1 %) nasal spray 1 spray, Each Nostril, 2 times daily, Use in each nostril as directed    empagliflozin (JARDIANCE) 25 mg, oral, Daily    labetalol (NORMODYNE) 100 mg, oral, Every 12 hours    metFORMIN XR (GLUCOPHAGE-XR) 1,000 mg, oral, 2 times daily (morning and late afternoon), Do not crush, chew, or split.    pediatric multivitamin tablet,chewable 1 tablet    psyllium (Metamucil) 0.4 gram capsule 2 capsules, Daily        BI mammo bilateral diagnostic tomosynthesis, BI US breast limited left  Narrative: Interpreted By:  Jl Beasley,   STUDY:  BI US BREAST LIMITED LEFT; BI MAMMO BILATERAL DIAGNOSTIC  TOMOSYNTHESIS;  1/20/2025 3:01 pm; 1/20/2025 2:30 pm      ACCESSION NUMBER(S):  [id number]; [id number]      ORDERING CLINICIAN:  DAPHNE GODWIN      INDICATION:  Annual exam with follow-up for a probably benign left breast mass.  History of benign right breast biopsy. Patient has a family history  of breast cancer.      ,R92.8 Other abnormal and inconclusive findings on diagnostic imaging  of breast      COMPARISON:  01/17/2024, 07/17/2024, 02/18/2020      FINDINGS:  MAMMOGRAPHY: 2D and tomosynthesis images were reviewed at 1 mm slice  thickness.      Density:  The breasts are almost entirely fatty.      Stable circumscribed mass in the  upper outer left breast at anterior  depth. No suspicious masses or calcifications are identified in the  right breast.      ULTRASOUND: Targeted ultrasound was performed of the left breast by a  registered sonographer with elastography. In the left breast at 2  o'clock, 2 cm from the nipple, there is a stable oval parallel  circumscribed hypoechoic mass which is soft on elastography and does  not contain flow measuring 0.7 x 0.2 x 0.7 cm. This has been stable  for 1 year and is probably benign.      Impression: Stable appearance of probably benign left breast mass. Final follow  up is recommended in 12 months at the time of patient's next annual  screening.      No mammographic evidence of malignancy in the right breast.      BI-RADS CATEGORY:  BI-RADS Category:  3 Probably Benign.  Recommendation:  Short-term Interval Follow-up Imaging.  Recommended Date:  1 Year.  Laterality:  Left.      For any future breast imaging appointments, please call [phone]).          MACRO:  None      Signed by: Jl Beasley 1/20/2025 9:01 PM  Dictation workstation:   [id number]           Assessment & Plan  Iron deficiency anemia (suspected)  Iron deficiency anemia suspected due to low iron saturation and symptoms like hair thinning. Discussed potential liver injury with Nutrafol and alternatives for hair thinning.  - Recheck iron levels.  - Consider referral to a specialist for further management if needed.  - Discussed use of biotin supplements and potential interference with thyroid testing.    Epigastric pain  Epigastric pain improving, likely due to dietary factors and gas.      Asthma (suspected)  Symptoms suggest possible asthma with wheezing and difficulty breathing, especially during allergy season. Previous albuterol use provided relief. Discussed potential use of Symbicort if asthma is confirmed.  - Order pulmonary function test with methacholine challenge to assess for asthma.  - Continue albuterol as needed for  acute symptoms.  - Discuss potential use of Symbicort if asthma is confirmed.    Allergic rhinitis  Frequent allergy symptoms managed with azelastine nasal spray. Educated on nasal spray's role in preventing allergic reactions.  - Continue azelastine nasal spray recommend adding flonase   - Start oral antihistamine such as Zyrtec, Allegra, or Claritin during high pollen seasons.  - Educate on proper use of nasal spray to prevent downstream allergic reactions.    Sleep apnea (suspected)  Symptoms include fatigue and observed apneic episodes. Discussed benefits of CPAP for oxygenation and weight loss.  - Order home sleep test.  - Refer to sleep medicine specialist for further evaluation and management.      Health Maintenance  Cancer screening:   - Pap due 2025 (Dr. Hernández)   - Mammogram + ultrasound stable left breast mass seen by Magnolia Araujo DO       This medical note was created with the assistance of artificial intelligence (AI) for documentation purposes. The content has been reviewed and confirmed by the healthcare provider for accuracy and completeness. Patient consented to the use of audio recording and use of AI during their visit.

## 2025-04-23 NOTE — PATIENT INSTRUCTIONS
VISIT SUMMARY:  Today, we discussed your asthma and allergy symptoms, frequent illnesses, abdominal pain, iron deficiency, sleep apnea, and breast health concerns. We reviewed your current medications and made some adjustments to better manage your symptoms and overall health.    YOUR PLAN:  -IRON DEFICIENCY ANEMIA (SUSPECTED): Iron deficiency anemia is a condition where your body lacks enough iron to produce healthy red blood cells. We will recheck your iron levels and may refer you to a specialist if needed. We also discussed the use of biotin supplements for hair thinning, but be aware that biotin can interfere with thyroid tests.    -EPIGASTRIC PAIN: Epigastric pain is discomfort in the upper abdomen, often related to diet and gas. Your symptoms are improving, so continue with your current dietary adjustments.    -ASTHMA (SUSPECTED): Asthma is a condition where your airways narrow and swell, causing wheezing and difficulty breathing. We will order a pulmonary function test with a methacholine challenge to confirm asthma. Continue using your albuterol inhaler as needed, and we may consider adding Symbicort if asthma is confirmed.    -ALLERGIC RHINITIS: Allergic rhinitis is an allergic reaction that causes sneezing, congestion, and a runny nose. Continue using azelastine nasal spray and start an oral antihistamine like Zyrtec, Allegra, or Claritin during high pollen seasons. Proper use of the nasal spray can help prevent allergic reactions.    -SLEEP APNEA (SUSPECTED): Sleep apnea is a sleep disorder where breathing repeatedly stops and starts. We will order a home sleep test and refer you to a sleep medicine specialist for further evaluation and management.    INSTRUCTIONS:  Please follow up with the recommended tests and specialist referrals. Recheck your iron levels as discussed. Continue using your albuterol inhaler as needed and start an oral antihistamine during high pollen seasons. We will contact you with  the results of your pulmonary function test and home sleep test.     Followup 3 months possibly physical

## 2025-05-07 DIAGNOSIS — R06.00 DYSPNEA, UNSPECIFIED TYPE: ICD-10-CM

## 2025-05-12 DIAGNOSIS — E11.65 TYPE 2 DIABETES MELLITUS WITH HYPERGLYCEMIA, WITHOUT LONG-TERM CURRENT USE OF INSULIN: ICD-10-CM

## 2025-05-13 RX ORDER — METFORMIN HYDROCHLORIDE 500 MG/1
TABLET, EXTENDED RELEASE ORAL
Qty: 120 TABLET | Refills: 5 | Status: SHIPPED | OUTPATIENT
Start: 2025-05-13

## 2025-05-23 DIAGNOSIS — R10.12 LEFT UPPER QUADRANT ABDOMINAL PAIN: Primary | ICD-10-CM

## 2025-05-27 ENCOUNTER — APPOINTMENT (OUTPATIENT)
Dept: HEMATOLOGY/ONCOLOGY | Facility: CLINIC | Age: 35
End: 2025-05-27
Payer: COMMERCIAL

## 2025-05-28 ENCOUNTER — HOSPITAL ENCOUNTER (OUTPATIENT)
Dept: RESPIRATORY THERAPY | Facility: HOSPITAL | Age: 35
Discharge: HOME | End: 2025-05-28
Payer: COMMERCIAL

## 2025-05-28 DIAGNOSIS — R06.2 WHEEZING: ICD-10-CM

## 2025-05-28 PROCEDURE — 94729 DIFFUSING CAPACITY: CPT | Performed by: INTERNAL MEDICINE

## 2025-05-28 PROCEDURE — 94060 EVALUATION OF WHEEZING: CPT

## 2025-05-28 PROCEDURE — 94060 EVALUATION OF WHEEZING: CPT | Performed by: INTERNAL MEDICINE

## 2025-05-28 PROCEDURE — 94726 PLETHYSMOGRAPHY LUNG VOLUMES: CPT | Performed by: INTERNAL MEDICINE

## 2025-06-01 DIAGNOSIS — E11.65 TYPE 2 DIABETES MELLITUS WITH HYPERGLYCEMIA, WITHOUT LONG-TERM CURRENT USE OF INSULIN: ICD-10-CM

## 2025-06-02 ENCOUNTER — APPOINTMENT (OUTPATIENT)
Dept: RADIOLOGY | Facility: CLINIC | Age: 35
End: 2025-06-02
Payer: COMMERCIAL

## 2025-06-02 DIAGNOSIS — R10.12 LEFT UPPER QUADRANT ABDOMINAL PAIN: ICD-10-CM

## 2025-06-02 PROCEDURE — 74176 CT ABD & PELVIS W/O CONTRAST: CPT

## 2025-06-02 PROCEDURE — 74176 CT ABD & PELVIS W/O CONTRAST: CPT | Performed by: RADIOLOGY

## 2025-06-05 ENCOUNTER — OFFICE VISIT (OUTPATIENT)
Dept: PRIMARY CARE | Facility: CLINIC | Age: 35
End: 2025-06-05
Payer: COMMERCIAL

## 2025-06-05 VITALS
WEIGHT: 290 LBS | OXYGEN SATURATION: 96 % | DIASTOLIC BLOOD PRESSURE: 81 MMHG | BODY MASS INDEX: 48.26 KG/M2 | HEART RATE: 90 BPM | SYSTOLIC BLOOD PRESSURE: 114 MMHG

## 2025-06-05 DIAGNOSIS — J30.1 SEASONAL ALLERGIC RHINITIS DUE TO POLLEN: Primary | ICD-10-CM

## 2025-06-05 PROCEDURE — 3079F DIAST BP 80-89 MM HG: CPT | Performed by: INTERNAL MEDICINE

## 2025-06-05 PROCEDURE — 1036F TOBACCO NON-USER: CPT | Performed by: INTERNAL MEDICINE

## 2025-06-05 PROCEDURE — 3074F SYST BP LT 130 MM HG: CPT | Performed by: INTERNAL MEDICINE

## 2025-06-05 PROCEDURE — 3052F HG A1C>EQUAL 8.0%<EQUAL 9.0%: CPT | Performed by: INTERNAL MEDICINE

## 2025-06-05 PROCEDURE — 99215 OFFICE O/P EST HI 40 MIN: CPT | Performed by: INTERNAL MEDICINE

## 2025-06-05 RX ORDER — AZELASTINE HYDROCHLORIDE, FLUTICASONE PROPIONATE 137; 50 UG/1; UG/1
1 SPRAY, METERED NASAL 2 TIMES DAILY
Qty: 1 EACH | Refills: 1 | Status: SHIPPED | OUTPATIENT
Start: 2025-06-05

## 2025-06-05 ASSESSMENT — PAIN SCALES - GENERAL: PAINLEVEL_OUTOF10: 0-NO PAIN

## 2025-06-05 NOTE — PROGRESS NOTES
Subjective   Patient ID: Arti Macias is a 34 y.o. female who presents for Results.  History of Present Illness      Arti Macias is a 34 year old female who presents for evaluation of respiratory symptoms and follow-up on recent diagnostic tests.    She experiences ongoing respiratory symptoms, including reactive wheezing triggered by certain factors, which resolves spontaneously. Pulmonary function tests were normal. She uses albuterol as needed but dislikes the side effects, such as heart palpitations, and has not been using it regularly.    She has seasonal allergies that exacerbate her respiratory symptoms, causing sneezing and a raspy voice with weather changes. She uses azelastine nasal spray as needed, which is effective, and occasionally takes Zyrtec, though it causes drowsiness.    She has a history of gallstones, with a large stone still present. Symptoms occur intermittently but have become less frequent since she improved her diet by reducing fried food intake.    She has been diagnosed with MASLD and is actively working on weight loss, having lost a significant amount of weight from her highest weight of 355-360 pounds. She is concerned about the potential impact of MASLD on her health.    CT notable for a mildly enlarged spleen, which was not present in previous imaging. She experiences left upper quadrant pain, described as a 'tight rubber band' sensation. She has a past history of mononucleosis    She has a small hiatal hernia, which is not concerning as it does not cause significant symptoms. She also has a history of fibroids, which are not currently symptomatic.    She experiences occasional constipation, going one to two days without a bowel movement, followed by a large evacuation. She is considering increasing her fiber intake and possibly using probiotics to manage this issue.      PMHx:  - DM2 - age 16, neuropathy 8% on metformin 1000 twice daily and Jardiance followed by  endocrinology needs optimization prior to fertility treatment  - JACKIE - chronic - previously on several medications discontinued in adulthood, recommended therapy declined medical management  - Wheezing -use albuterol inhaler, PFTs notable for possible early asthma now pending methacholine challenge test  - RLS/ Iron deficiency - on iron supplement every other day. Seen by Bina inman recommend EGD and colonoscopy not yet performed, seen by rosmery ernst venofer with significant improvement in symptoms, though now has had to resume compression stockings.  B12 levels borderline low recommended supplementation  - Class 3 obesity -  previously on phentermine  - HTN  - on labetolol trying to conceive   - Gallstones for the past 10 years. not an issue for her last flare over 20 years ago. Gets intermittent flares  - SHAKEEL - diagnosed over 10 years ago ordered HSAT previously used CPAP pending sleep study and followup with sleep physician.   - Lower extremity edema thought secondary to venous insufficiency   - Family of breast cancer and dense breasts - seen at breast at [facility] recommended q6m exams   - MASLD  -noted on ultrasound     Social:   - Lives at home with ,  at [facility]   - no children, goal is for her to have a child   - Sisters with Annamaria Bhardwaj, a nurse.         PMHx, FHx, Social Hx, Surg Hx personally reviewed at this appointment. No pertinent findings and/or changes from prior (if applicable).      Objective     /81   Pulse 90   Wt 132 kg (290 lb)   SpO2 96%   BMI 48.26 kg/m²    General: Alert and oriented, in no apparent distress, declines abdominal exam today   HEENT: No conjunctival erythema, no external facial lesions   Lungs: Breathing comfortably  Skin: No evidence of skin breakdown.  Neuro: AAO x 3, answering questions appropriately, no obvious cranial nerve deficits       Lab Results   Component Value Date    WBC 11.0 11/22/2024    HGB 13.1 11/22/2024    HCT 41.3 11/22/2024      11/22/2024    CHOL 197 06/03/2024    TRIG 280 (H) 06/03/2024    HDL 41.0 06/03/2024    ALT 14 06/03/2024    AST 11 06/03/2024     06/03/2024    K 4.5 06/03/2024     06/03/2024    CREATININE 0.71 06/03/2024    BUN 10 06/03/2024    CO2 26 06/03/2024    TSH 1.32 06/03/2024    HGBA1C 8.0 (A) 03/19/2025         Current Outpatient Medications   Medication Instructions    albuterol 90 mcg/actuation inhaler 2 puffs, inhalation, Every 6 hours PRN    azelastine (Astelin) 137 mcg (0.1 %) nasal spray 1 spray, Each Nostril, 2 times daily, Use in each nostril as directed    empagliflozin (JARDIANCE) 25 mg, oral, Daily    labetalol (NORMODYNE) 100 mg, oral, Every 12 hours    metFORMIN  mg 24 hr tablet TAKE 2 TABLETS (1,000 MG) BY MOUTH 2 TIMES DAILY (MORNING AND LATE AFTERNOON). DO NOT CRUSH, CHEW, OR SPLIT.    pediatric multivitamin tablet,chewable 1 tablet        CT abdomen pelvis wo IV contrast  Narrative: Interpreted By:  Nicola López,   STUDY:  CT ABDOMEN PELVIS WO IV CONTRAST;  6/2/2025 3:41 pm      INDICATION:  Signs/Symptoms:left upper quadrant pain.      COMPARISON:  08/17/2023      ACCESSION NUMBER(S):  [id number]      ORDERING CLINICIAN:  ELVIA HERBERT      TECHNIQUE:  CT of the abdomen and pelvis was performed. Contiguous axial images  were obtained at 3 mm slice thickness through the abdomen and pelvis.  Coronal and sagittal reconstructions at 3 mm slice thickness were  performed.  No intravenous contrast was administered.      FINDINGS:  Please note that the evaluation of vessels, lymph nodes and organs is  limited without intravenous contrast.      LOWER CHEST:  Similar 4 mm right lung base nodule image 37. Small hiatal hernia.      ABDOMEN:      LIVER:  Diffuse fatty liver. Enlarged up to 25 cm craniocaudal.      BILE DUCTS:  No significant biliary ductal dilatation.      GALLBLADDER:  At least 1 large lamellated gallstone measuring up to at least 3.1 cm.      PANCREAS:  Within normal  limits.      SPLEEN:  Mildly enlarged up to 12 cm craniocaudal and 13 cm AP dimension.  Probable small adjacent splenules redemonstrated.      ADRENAL GLANDS:  Within normal limits.      KIDNEYS AND URETERS:  No renal stones or hydronephrosis.      PELVIS:      BLADDER:  Within normal limits.      REPRODUCTIVE ORGANS:  Enlarged lobulated uterus could reflect sequela of uterine fibroids  among other etiology and grossly similar to prior. Probable  cystic/follicular changes of the ovaries although suboptimally  evaluated by CT.      BOWEL:  No definite bowel obstruction. Prominent colonic stool. Appendix not  well visualized but no evidence for inflammatory changes in the right  lower quadrant. Probable appendicolith but no definite regional  inflammatory change. Some colonic diverticulosis but no definite  acute inflammatory changes. Portions of left and transverse colon  poorly distended limiting evaluation.      VESSELS:  No significantatherosclerotic changes are noted involving the aorta  and branching vessels. No aortic aneurysm. IVC appears normal in  caliber.      PERITONEUM/RETROPERITONEUM/LYMPH NODES:  No ascites or free air, no fluid collection.  Mildly prominent  nonspecific dom hepatis/peripancreatic nodes up to 11 mm short axis  similar to prior and mildly prominent nonspecific mesenteric nodes  scattered throughout also similar. Mildly prominent nonspecific  pelvic/iliac chain nodes up to 1 cm short axis on the left image 129  also similar.      ABDOMINAL WALL:  Stable appearance.      BONES:  Approximate 1.4 cm irregular sclerotic lesion with central lucency L1  vertebral body image 57 grossly similar to prior. Mild multilevel  degenerative changes visualized spine. Disc osteophyte complex T7-T8  redemonstrated causing mild central canal stenosis. Probable  vertebral body hemangioma T8 redemonstrated. Arthritic changes about  the SI joints.      Impression: 1.  Cholelithiasis with at least 1 large  lamellated gallstone.  2. Enlarged fatty liver. Correlate with LFTs. Mild splenomegaly.  3. Enlarged lobulated uterus could reflect sequela of uterine  fibroids among other etiologies and grossly similar to prior.  Probable cystic/follicular changes of the ovaries. Findings otherwise  suboptimally evaluated by CT. Suggest dedicated pelvic sonography for  further assessment.  4. Mildly prominent nonspecific abdominopelvic nodes as above.  5. Redemonstration approximate 1.4 cm irregular sclerotic lesion with  central lucency L1 vertebral body, could represent a bone island and  grossly similar to prior although other etiologies are not entirely  excluded. Correlation with bone scan or MRI may be considered for  further assessment.  6. Similar 4 mm right lung base nodule, consistent with benign  etiology.  7. Additional findings as above.          MACRO:  None      Signed by: Nicola López 6/3/2025 9:15 AM  Dictation workstation:   [id number]           Assessment & Plan    MAF       Asthma (suspected)  Pulmonary function tests were borderline, not meeting asthma criteria. Symptoms suggest reactive wheezing. Methacholine challenge test planned for confirmation. Albuterol causes palpitations, preferred to avoid.  - Perform methacholine challenge test to confirm asthma diagnosis.  - Consider ICS/LABA inhaler if methacholine challenge confirms asthma.    Allergic rhinitis  Symptoms managed with azelastine nasal spray. Prefers azelastine over Flonase. Zyrtec causes drowsiness. Considering Dymista despite cost.  - Prescribe Dymista nasal spray.  - Instruct on proper nasal spray technique: aim towards the corner, light sniff, do not swallow.    Gallstones  Large gallstone causing intermittent symptoms. Awaiting A1c results before surgery. Symptoms managed by dietary changes. Risk of biliary obstruction noted.  - Monitor A1c levels at the end of the month.  - Plan for gallbladder surgery post A1c evaluation.    Metabolic  Associated Steatotic Liver Disease (MASLD)  Fatty liver changes likely due to obesity and diabetes. Weight loss is primary treatment. Liver function well-managed, but risk of scarring and spleen enlargement noted. Fib-4 score indicated low risk last year. Updated labs needed.  - Encourage weight loss of 5-7% to improve liver condition.  - Order updated liver function tests and CBC.  - Consider FibroScan if blood work indicates concern.  Low fib 4 as calculated by last year's labs pending repeat labs to be performed.      Spleen enlargement  Mild splenomegaly noted, possibly related to liver issues or past infections. Further evaluation needed.  - Perform blood work including CBC to assess spleen condition.  - Will reach out to radiology to estimate spleen volume given borderline enlargement noted by size.     Hiatal hernia  Small hiatal hernia present, contributing to reflux symptoms. No significant issues requiring intervention.    Fibroids  Fibroids noted, potentially affecting fertility. No significant symptoms currently. Pelvic ultrasound recommended if fertility issues pursued.  - Consider pelvic ultrasound if fertility issues are pursued.  - Consult gynecology for further workup, currently declines pelvic ultrasound.         Health Maintenance  Cancer screening:   - Pap due 2025 (Dr. Hernández)   - Mammogram + ultrasound stable left breast mass seen by Magnolia Araujo DO       This medical note was created with the assistance of artificial intelligence (AI) for documentation purposes. The content has been reviewed and confirmed by the healthcare provider for accuracy and completeness. Patient consented to the use of audio recording and use of AI during their visit.

## 2025-06-09 ENCOUNTER — HOSPITAL ENCOUNTER (OUTPATIENT)
Dept: RESPIRATORY THERAPY | Facility: HOSPITAL | Age: 35
Discharge: HOME | End: 2025-06-09
Payer: COMMERCIAL

## 2025-06-09 DIAGNOSIS — R06.2 WHEEZING: ICD-10-CM

## 2025-06-09 PROCEDURE — 94070 EVALUATION OF WHEEZING: CPT

## 2025-06-09 PROCEDURE — 94070 EVALUATION OF WHEEZING: CPT | Performed by: INTERNAL MEDICINE

## 2025-06-12 DIAGNOSIS — I10 PRIMARY HYPERTENSION: ICD-10-CM

## 2025-06-12 DIAGNOSIS — J45.20 MILD INTERMITTENT EXTRINSIC ASTHMA WITHOUT COMPLICATION (HHS-HCC): Primary | ICD-10-CM

## 2025-06-12 DIAGNOSIS — K76.0 METABOLIC DYSFUNCTION-ASSOCIATED FATTY LIVER DISEASE (MAFLD): ICD-10-CM

## 2025-06-12 RX ORDER — BUDESONIDE AND FORMOTEROL FUMARATE DIHYDRATE 80; 4.5 UG/1; UG/1
2 AEROSOL RESPIRATORY (INHALATION)
Qty: 10.2 G | Refills: 5 | Status: SHIPPED | OUTPATIENT
Start: 2025-06-12 | End: 2026-06-12

## 2025-06-12 RX ORDER — LABETALOL 100 MG/1
100 TABLET, FILM COATED ORAL EVERY 12 HOURS
Qty: 90 TABLET | Refills: 1 | Status: SHIPPED | OUTPATIENT
Start: 2025-06-12

## 2025-06-13 ENCOUNTER — CLINICAL SUPPORT (OUTPATIENT)
Dept: SLEEP MEDICINE | Facility: CLINIC | Age: 35
End: 2025-06-13
Payer: COMMERCIAL

## 2025-06-13 DIAGNOSIS — G47.33 OSA (OBSTRUCTIVE SLEEP APNEA): ICD-10-CM

## 2025-06-13 NOTE — PROGRESS NOTES
Type of Study: HOME SLEEP STUDY - NOMAD     The patient received equipment and instructions for use of the [facility] Nomad HSAT device. The patient was instructed how to apply the effort belts, cannula, thermistor. It was also explained how the Nomad and oximeter components work.  The patient was asked to record their sleep for an 8-hour period.     The patient was informed of their responsibility for the device and acknowledged this by signing the HSAT device contract. The patient was asked to return the device on 6/16/2025 between the hours of 8:00 am-4:00 pm to [facility].     The patient was instructed to call 911 as usual for any medical- emergencies while at home.  The patient was also given a phone number for troubleshooting when using the device in case there were additional questions.

## 2025-06-18 ENCOUNTER — APPOINTMENT (OUTPATIENT)
Dept: SLEEP MEDICINE | Facility: CLINIC | Age: 35
End: 2025-06-18
Payer: COMMERCIAL

## 2025-06-23 ENCOUNTER — LAB (OUTPATIENT)
Dept: LAB | Facility: HOSPITAL | Age: 35
End: 2025-06-23
Payer: COMMERCIAL

## 2025-06-23 DIAGNOSIS — E61.1 IRON DEFICIENCY: ICD-10-CM

## 2025-06-23 LAB
BASOPHILS # BLD AUTO: 0.05 X10*3/UL (ref 0–0.1)
BASOPHILS NFR BLD AUTO: 0.7 %
EOSINOPHIL # BLD AUTO: 0.12 X10*3/UL (ref 0–0.7)
EOSINOPHIL NFR BLD AUTO: 1.6 %
ERYTHROCYTE [DISTWIDTH] IN BLOOD BY AUTOMATED COUNT: 15 % (ref 11.5–14.5)
FERRITIN SERPL-MCNC: 25 NG/ML (ref 16–154)
FERRITIN SERPL-MCNC: 41 NG/ML (ref 8–150)
HCT VFR BLD AUTO: 46.3 % (ref 36–46)
HGB BLD-MCNC: 13.1 G/DL (ref 12–16)
IMM GRANULOCYTES # BLD AUTO: 0.02 X10*3/UL (ref 0–0.7)
IMM GRANULOCYTES NFR BLD AUTO: 0.3 % (ref 0–0.9)
IRON SATN MFR SERPL: 10 % (CALC) (ref 16–45)
IRON SATN MFR SERPL: 9 % (ref 25–45)
IRON SERPL-MCNC: 31 UG/DL (ref 35–150)
IRON SERPL-MCNC: 33 MCG/DL (ref 40–190)
LYMPHOCYTES # BLD AUTO: 1.8 X10*3/UL (ref 1.2–4.8)
LYMPHOCYTES NFR BLD AUTO: 23.8 %
MCH RBC QN AUTO: 24.3 PG (ref 26–34)
MCHC RBC AUTO-ENTMCNC: 28.3 G/DL (ref 32–36)
MCV RBC AUTO: 86 FL (ref 80–100)
MONOCYTES # BLD AUTO: 0.37 X10*3/UL (ref 0.1–1)
MONOCYTES NFR BLD AUTO: 4.9 %
NEUTROPHILS # BLD AUTO: 5.19 X10*3/UL (ref 1.2–7.7)
NEUTROPHILS NFR BLD AUTO: 68.7 %
NRBC BLD-RTO: 0 /100 WBCS (ref 0–0)
PLATELET # BLD AUTO: 342 X10*3/UL (ref 150–450)
RBC # BLD AUTO: 5.39 X10*6/UL (ref 4–5.2)
TIBC SERPL-MCNC: 332 MCG/DL (CALC) (ref 250–450)
TIBC SERPL-MCNC: 343 UG/DL (ref 240–445)
UIBC SERPL-MCNC: 312 UG/DL (ref 110–370)
VIT B12 SERPL-MCNC: 924 PG/ML (ref 211–911)
WBC # BLD AUTO: 7.6 X10*3/UL (ref 4.4–11.3)

## 2025-06-23 PROCEDURE — 83540 ASSAY OF IRON: CPT

## 2025-06-23 PROCEDURE — 82607 VITAMIN B-12: CPT

## 2025-06-23 PROCEDURE — 82728 ASSAY OF FERRITIN: CPT

## 2025-06-23 PROCEDURE — 83550 IRON BINDING TEST: CPT

## 2025-06-23 PROCEDURE — 85025 COMPLETE CBC W/AUTO DIFF WBC: CPT

## 2025-06-24 LAB
ALBUMIN SERPL-MCNC: 4.3 G/DL (ref 3.6–5.1)
ALBUMIN/CREAT UR: 3 MG/G CREAT
ALP SERPL-CCNC: 37 U/L (ref 31–125)
ALT SERPL-CCNC: 12 U/L (ref 6–29)
ANION GAP SERPL CALCULATED.4IONS-SCNC: 11 MMOL/L (CALC) (ref 7–17)
AST SERPL-CCNC: 11 U/L (ref 10–30)
BILIRUB SERPL-MCNC: 0.3 MG/DL (ref 0.2–1.2)
BUN SERPL-MCNC: 12 MG/DL (ref 7–25)
CALCIUM SERPL-MCNC: 9.6 MG/DL (ref 8.6–10.2)
CHLORIDE SERPL-SCNC: 105 MMOL/L (ref 98–110)
CHOLEST SERPL-MCNC: 209 MG/DL
CHOLEST/HDLC SERPL: 4.8 (CALC)
CO2 SERPL-SCNC: 23 MMOL/L (ref 20–32)
CREAT SERPL-MCNC: 0.82 MG/DL (ref 0.5–0.97)
CREAT UR-MCNC: 61 MG/DL (ref 20–275)
EGFRCR SERPLBLD CKD-EPI 2021: 95 ML/MIN/1.73M2
EST. AVERAGE GLUCOSE BLD GHB EST-MCNC: 177 MG/DL
EST. AVERAGE GLUCOSE BLD GHB EST-SCNC: 9.8 MMOL/L
GLUCOSE SERPL-MCNC: 194 MG/DL (ref 65–99)
HBA1C MFR BLD: 7.8 %
HDLC SERPL-MCNC: 44 MG/DL
LDLC SERPL CALC-MCNC: 115 MG/DL (CALC)
MICROALBUMIN UR-MCNC: 0.2 MG/DL
NONHDLC SERPL-MCNC: 165 MG/DL (CALC)
POTASSIUM SERPL-SCNC: 5.1 MMOL/L (ref 3.5–5.3)
PROT SERPL-MCNC: 7 G/DL (ref 6.1–8.1)
SODIUM SERPL-SCNC: 139 MMOL/L (ref 135–146)
TRIGL SERPL-MCNC: 352 MG/DL
TSH SERPL-ACNC: 1.53 MIU/L

## 2025-06-26 ENCOUNTER — TELEPHONE (OUTPATIENT)
Dept: ENDOCRINOLOGY | Facility: CLINIC | Age: 35
End: 2025-06-26
Payer: COMMERCIAL

## 2025-06-26 NOTE — TELEPHONE ENCOUNTER
APPT REMINDER PROVIDED PATIENT DOES NOT CHECK BS/ PATIENT DOES NOT HAVE CGM. PATIENT REQUESTED TO ARRIVE AT LEAST 15 MINUTES EARLY FOR THE APPOINTMENT.

## 2025-06-30 ENCOUNTER — APPOINTMENT (OUTPATIENT)
Dept: ENDOCRINOLOGY | Facility: CLINIC | Age: 35
End: 2025-06-30
Payer: COMMERCIAL

## 2025-06-30 VITALS — HEIGHT: 65 IN | WEIGHT: 288 LBS | BODY MASS INDEX: 47.98 KG/M2

## 2025-06-30 DIAGNOSIS — E66.813 OBESITY, CLASS III, BMI 40-49.9 (MORBID OBESITY): ICD-10-CM

## 2025-06-30 DIAGNOSIS — E11.65 TYPE 2 DIABETES MELLITUS WITH HYPERGLYCEMIA, WITHOUT LONG-TERM CURRENT USE OF INSULIN: Primary | ICD-10-CM

## 2025-06-30 PROCEDURE — 3008F BODY MASS INDEX DOCD: CPT | Performed by: NURSE PRACTITIONER

## 2025-06-30 PROCEDURE — 3052F HG A1C>EQUAL 8.0%<EQUAL 9.0%: CPT | Performed by: NURSE PRACTITIONER

## 2025-06-30 PROCEDURE — 99215 OFFICE O/P EST HI 40 MIN: CPT | Performed by: NURSE PRACTITIONER

## 2025-06-30 PROCEDURE — 1036F TOBACCO NON-USER: CPT | Performed by: NURSE PRACTITIONER

## 2025-06-30 RX ORDER — EMPAGLIFLOZIN, LINAGLIPTIN, METFORMIN HYDROCHLORIDE 12.5; 2.5; 1 MG/1; MG/1; MG/1
1 TABLET, EXTENDED RELEASE ORAL 2 TIMES DAILY
Qty: 180 TABLET | Refills: 1 | Status: SHIPPED | OUTPATIENT
Start: 2025-06-30

## 2025-06-30 ASSESSMENT — ENCOUNTER SYMPTOMS
DEPRESSION: 0
LOSS OF SENSATION IN FEET: 0
OCCASIONAL FEELINGS OF UNSTEADINESS: 1

## 2025-06-30 ASSESSMENT — PAIN SCALES - GENERAL: PAINLEVEL_OUTOF10: 0-NO PAIN

## 2025-06-30 ASSESSMENT — PATIENT HEALTH QUESTIONNAIRE - PHQ9
1. LITTLE INTEREST OR PLEASURE IN DOING THINGS: NOT AT ALL
SUM OF ALL RESPONSES TO PHQ9 QUESTIONS 1 AND 2: 0
2. FEELING DOWN, DEPRESSED OR HOPELESS: NOT AT ALL

## 2025-06-30 NOTE — PROGRESS NOTES
"Subjective   Arti Macias is a 34 y.o. female presents today for a follow up of DM Type 2. Initial diagnosis with diabetes was 12-15 year ago. (+) family history of diabetes in her dad.    Known complications include: obesity, HTN     Previously seeing Dr. Dowell through [facility].   Last visit with me 3/2025  A1c 7.8% in 6/2025.  Previous A1c 8%.   She has been on metformin since diagnosis  She also has struggled with weight her whole life   Highest weight 355-360 lbs.   Unable to tolerate phentermine due to mood, irritability and anxiety    Since last visit, she was able to start jardiance  Initially had increased urination but has improved    Highest weight 350 lbs 3-5 years ago   Starting weight 313 lbs in March 2023  Weight in June 2023 293 lbs  Weight in December 2024 321 lbs.   Today 288 lb on home scale.       is eating more low carb   She is trying to have protein bar like [employer] bars or quest   She is trying to make one drink at home instead of [employer]         Current diabetes regimen is as follows:   Metformin  mg 2 pills twice daily  Jardiance 25 mg once daily     The patient is not currently checking the blood glucose - she is needlephobic    Hypoglycemia frequency: Denies  Hypoglycemia awareness: Yes     The patient comes into the office today with wanting to lose weight and continue to get A1c to goal.       ROS  General: no fever, chills.  Weight see HPI   Skin: no rashes, pruritis or dry skin  Cardiac: denies chest pain, heart palpitations or orthopnea  Pulmonary: denies wheezing, productive cough or exertional dyspnea      Objective    Physical Exam  Height 1.651 m (5' 5\"), weight 131 kg (288 lb), last menstrual period 06/18/2025, not currently breastfeeding.  Unable to obtain blood pressure today due to virtual visit  General Appearance: Well appearing, alert, in no acute distress, well-hydrated, well nourished.  Affect: alert, cooperative           Current Outpatient Medications:     " albuterol 90 mcg/actuation inhaler, Inhale 2 puffs every 6 hours if needed., Disp: , Rfl:     azelastine (Astelin) 137 mcg (0.1 %) nasal spray, Administer 1 spray into each nostril 2 times a day. Use in each nostril as directed, Disp: 30 mL, Rfl: 12    azelastine-fluticasone (Dymista) 137-50 mcg/spray nasal spray, Administer 1 spray into each nostril 2 times a day. Use in each nostril as directed, Disp: 1 each, Rfl: 1    budesonide-formoterol (Symbicort) 80-4.5 mcg/actuation inhaler, Inhale 2 puffs 2 times a day. Rinse mouth with water after use to reduce aftertaste and incidence of candidiasis. Do not swallow., Disp: 10.2 g, Rfl: 5    empagliflozin (Jardiance) 25 mg, Take 1 tablet (25 mg) by mouth once daily., Disp: 90 tablet, Rfl: 3    labetalol (Normodyne) 100 mg tablet, TAKE 1 TABLET BY MOUTH EVERY 12 HOURS, Disp: 90 tablet, Rfl: 1    metFORMIN  mg 24 hr tablet, TAKE 2 TABLETS (1,000 MG) BY MOUTH 2 TIMES DAILY (MORNING AND LATE AFTERNOON). DO NOT CRUSH, CHEW, OR SPLIT., Disp: 120 tablet, Rfl: 5    pediatric multivitamin tablet,chewable, Chew 1 tablet., Disp: , Rfl:     Assessment/Plan   Type 2 diabetes with hyperglycemia without long term use insulin:   Obesity BMI >40 :   BMI 49:  - A1c not at goal but improving since starting jardiance.  Will increase jardiance today.  She is tolerating well.  She has also made adjustments to her diet.  Her  is eating low carb so she is trying too.  She has lost about 25 lbs in the last 3 months.  I congratulated her on this today.  I would recommend modified protein with low carb diet and increase her exercise.  Could consider synjardy to lessen her pill burden       Plan:   Stop Jardiance  Stop Metformin  Change to Trijardy XR 1000-2.5-12.5 mg twice daily   Follow up in Sept as planned      Virtual or Telephone Consent    An interactive audio and video telecommunication system which permits real time communications between the patient (at the originating site)  and provider (at the distant site) was utilized to provide this telehealth service.   Verbal consent was requested and obtained from Arti Macias on this date, 06/30/25 for a telehealth visit and the patient's location was confirmed at the time of the visit.

## 2025-06-30 NOTE — Clinical Note
Can you please add her to my schedule 1/20/2026 at 315pm for DM and weight follow up?  Ok to use the new slot.  Patient is aware of time and date. Thanks.

## 2025-06-30 NOTE — Clinical Note
She is already on jardiance which I think needed PA.  I am changing to Trijardy which is combo metformin- jardiance-tradjenta

## 2025-07-01 ENCOUNTER — APPOINTMENT (OUTPATIENT)
Dept: PRIMARY CARE | Facility: CLINIC | Age: 35
End: 2025-07-01
Payer: COMMERCIAL

## 2025-07-03 ENCOUNTER — TELEPHONE (OUTPATIENT)
Dept: ENDOCRINOLOGY | Facility: CLINIC | Age: 35
End: 2025-07-03
Payer: COMMERCIAL

## 2025-07-03 NOTE — TELEPHONE ENCOUNTER
Prior Auth for trijardy pending determination  Submitted on 7/3 via cmm    KEY: [id number]

## 2025-07-10 DIAGNOSIS — E61.1 IRON DEFICIENCY: Primary | ICD-10-CM

## 2025-07-11 ENCOUNTER — PATIENT MESSAGE (OUTPATIENT)
Dept: ENDOCRINOLOGY | Facility: CLINIC | Age: 35
End: 2025-07-11
Payer: COMMERCIAL

## 2025-07-21 ENCOUNTER — CLINICAL SUPPORT (OUTPATIENT)
Dept: GASTROENTEROLOGY | Facility: CLINIC | Age: 35
End: 2025-07-21
Payer: COMMERCIAL

## 2025-07-21 DIAGNOSIS — K76.0 METABOLIC DYSFUNCTION-ASSOCIATED FATTY LIVER DISEASE (MAFLD): ICD-10-CM

## 2025-07-21 PROCEDURE — 91200 LIVER ELASTOGRAPHY: CPT | Performed by: INTERNAL MEDICINE

## 2025-07-21 NOTE — LETTER
July 25, 2025     Ana Araujo DO  [address] [facility] [id number]    Patient: Arti Macias   YOB: 1990   Date of Visit: 7/21/2025       Dear Dr. Ana Araujo DO:    Thank you for referring Arti Macias for evaluation with FibroScan. Below are my notes for this consultation.  If you have questions, please do not hesitate to call me.      Sincerely,     [id number] FIBROSCAN    CC: No Recipients  ______________________________________________________________________________________    Results:  E (median liver stiffness measurement):   7.8   kPa  CAP (controlled attenuation parameter):   312  dB/m  IQR/med: 25%    Interpretation:  This was a technically adequate study. Liver stiffness measurements were variable but IQR/med is below < 30% and this was an acceptable study.    The Fibrosis score is consistent with Metavir F1  . The CAP score is consistent with 34 - 66% hepatocyte steatosis (steatosis grade 2 of 3 ) .    Ray Mendiola MD  Hepatology

## 2025-07-25 NOTE — PROGRESS NOTES
Results:  E (median liver stiffness measurement):   7.8   kPa  CAP (controlled attenuation parameter):   312  dB/m  IQR/med: 25%    Interpretation:  This was a technically adequate study. Liver stiffness measurements were variable but IQR/med is below < 30% and this was an acceptable study.    The Fibrosis score is consistent with Metavir F1  . The CAP score is consistent with 34 - 66% hepatocyte steatosis (steatosis grade 2 of 3 ) .    Ray Mendiola MD  Hepatology

## 2025-08-06 ENCOUNTER — OFFICE VISIT (OUTPATIENT)
Dept: PRIMARY CARE | Facility: CLINIC | Age: 35
End: 2025-08-06
Payer: COMMERCIAL

## 2025-08-06 VITALS
WEIGHT: 280 LBS | BODY MASS INDEX: 46.59 KG/M2 | HEART RATE: 98 BPM | SYSTOLIC BLOOD PRESSURE: 114 MMHG | OXYGEN SATURATION: 98 % | DIASTOLIC BLOOD PRESSURE: 80 MMHG

## 2025-08-06 DIAGNOSIS — E78.2 MIXED HYPERLIPIDEMIA: ICD-10-CM

## 2025-08-06 DIAGNOSIS — J30.9 ALLERGIC RHINITIS, UNSPECIFIED SEASONALITY, UNSPECIFIED TRIGGER: ICD-10-CM

## 2025-08-06 DIAGNOSIS — E61.1 IRON DEFICIENCY: ICD-10-CM

## 2025-08-06 DIAGNOSIS — E11.65 TYPE 2 DIABETES MELLITUS WITH HYPERGLYCEMIA, WITHOUT LONG-TERM CURRENT USE OF INSULIN: ICD-10-CM

## 2025-08-06 DIAGNOSIS — K76.0 METABOLIC DYSFUNCTION-ASSOCIATED STEATOTIC LIVER DISEASE (MASLD): ICD-10-CM

## 2025-08-06 DIAGNOSIS — R16.1 SPLENOMEGALY: ICD-10-CM

## 2025-08-06 DIAGNOSIS — G47.33 OBSTRUCTIVE SLEEP APNEA: ICD-10-CM

## 2025-08-06 DIAGNOSIS — K80.20 GALLSTONES: Primary | ICD-10-CM

## 2025-08-06 DIAGNOSIS — E66.813 CLASS 3 SEVERE OBESITY WITH SERIOUS COMORBIDITY AND BODY MASS INDEX (BMI) OF 45.0 TO 49.9 IN ADULT, UNSPECIFIED OBESITY TYPE: ICD-10-CM

## 2025-08-06 PROBLEM — E66.9 OBESITY WITH SERIOUS COMORBIDITY: Status: ACTIVE | Noted: 2023-02-18

## 2025-08-06 PROCEDURE — 99214 OFFICE O/P EST MOD 30 MIN: CPT | Performed by: INTERNAL MEDICINE

## 2025-08-06 PROCEDURE — 1036F TOBACCO NON-USER: CPT | Performed by: INTERNAL MEDICINE

## 2025-08-06 PROCEDURE — 3079F DIAST BP 80-89 MM HG: CPT | Performed by: INTERNAL MEDICINE

## 2025-08-06 PROCEDURE — 3052F HG A1C>EQUAL 8.0%<EQUAL 9.0%: CPT | Performed by: INTERNAL MEDICINE

## 2025-08-06 PROCEDURE — 3074F SYST BP LT 130 MM HG: CPT | Performed by: INTERNAL MEDICINE

## 2025-08-06 ASSESSMENT — PAIN SCALES - GENERAL: PAINLEVEL_OUTOF10: 0-NO PAIN

## 2025-08-06 NOTE — PROGRESS NOTES
Subjective   Patient ID: Arti Macias is a 34 y.o. female who presents for Results (/).  History of Present Illness  34-year female here for follow-up visit, last seen in June.    7/25: fibroscan F1 and moderate steatosis strongly recommended GLP1    She has lost significant weight since December, decreasing from approximately 360 pounds to 279 pounds. This weight loss is attributed to dietary changes, including a modified protein-sparing diet and increased protein bar consumption. She is not taking Synjardy due to cost but is currently on Jardiance, which she feels aids in her weight loss.    She has iron deficiency anemia, likely due to heavy menstrual bleeding. She takes Flintstones vitamins with iron and is considering an additional iron supplement. Her menstrual periods vary in heaviness, and she sometimes experiences constipation, which she manages with fiber supplements and increased water intake.      PMHx:  - DM2 - age 16, neuropathy 7.8%  on metformin + Jardiance  followed by endocrinology needs optimization prior to fertility treatment  - JACKIE - chronic - previously on several medications discontinued in adulthood, recommended therapy declined medical management  - Reactive wheezing -use albuterol inhaler, PFTs notable for possible early asthma now pending methacholine challenge test  - Allergic rhinitis -given Dymista  - RLS/ Iron deficiency - on iron supplement every other day. Seen by Bina inman recommend EGD and colonoscopy not yet performed, seen by heme started venofer with significant improvement in symptoms, though now has had to resume compression stockings.  B12 levels borderline low recommended supplementation  - Class 3 obesity -  previously on phentermine  - HTN  - on labetolol trying to conceive   - Gallstones for the past 10 years. not an issue for her last flare over 20 years ago. Gets intermittent flares-recommended surgery  - SHAKEEL - HSAT mild 6/25  CPAP pending sleep study and  followup with sleep physician.   - Lower extremity edema thought secondary to venous insufficiency   - Family of breast cancer and dense breasts - seen at breast at [facility] recommended q6m exams   - MASLD  -  -small hiatal hernia     Social:   - Lives at home with ,  at [facility]   - no children, goal is for her to have a child   - Sisters with Annamaria Bhardwaj, a nurse.      PMHx, FHx, Social Hx, Surg Hx personally reviewed at this appointment. No pertinent findings and/or changes from prior (if applicable).      Objective     /80   Pulse 98   Wt 127 kg (280 lb)   SpO2 98%   BMI 46.59 kg/m²      General: Alert and oriented, in no apparent distress   HEENT: No conjunctival erythema, no external facial lesions   Lungs: Breathing comfortably  Skin: No evidence of skin breakdown.  Neuro: AAO x 3, answering questions appropriately, no obvious cranial nerve deficits         Current Outpatient Medications   Medication Instructions    albuterol 90 mcg/actuation inhaler 2 puffs, Every 6 hours PRN    azelastine (Astelin) 137 mcg (0.1 %) nasal spray 1 spray, Each Nostril, 2 times daily, Use in each nostril as directed    budesonide-formoterol (Symbicort) 80-4.5 mcg/actuation inhaler 2 puffs, inhalation, 2 times daily RT, Rinse mouth with water after use to reduce aftertaste and incidence of candidiasis. Do not swallow.    empaglifloz-linaglip-metformin (Trijardy XR) 12.5-2.5-1,000 mg tablet, IR - ER, biphasic 24hr tablet 1 tablet, oral, 2 times daily    empagliflozin (JARDIANCE) 25 mg, oral, Daily    labetalol (NORMODYNE) 100 mg, oral, Every 12 hours    metFORMIN  mg 24 hr tablet TAKE 2 TABLETS (1,000 MG) BY MOUTH 2 TIMES DAILY (MORNING AND LATE AFTERNOON). DO NOT CRUSH, CHEW, OR SPLIT.    pediatric multivitamin tablet,chewable 1 tablet            Assessment & Plan  Gallstones  Asymptomatic gallstones with history of pain, indicating potential for future issues.  - Discuss with surgeon regarding  potential impact of liver or spleen enlargement on gallbladder surgery.  - Plan for gallbladder removal due to potential for future pain.    Metabolic Associated Steatotic Liver Disease (MASLD)  MASLD with level 1 fibrosis on FibroScan. Risk of progression exists, but condition is currently well-managed. Weight loss is the primary treatment strategy.  - Continue weight loss and healthy eating to improve liver condition.  - Repeat FibroScan in the future to monitor liver status.    Splenomegaly  Splenomegaly with undetermined cause, possibly related to MASLD or other factors.  - Recommend consideration for gastroenterology referral   - Monitor bowel movements and dietary habits to assess correlation with symptoms.    Iron deficiency due to heavy menstrual bleeding  Iron deficiency likely due to heavy menstrual bleeding. Current supplementation with Flintstones vitamins may be insufficient.  - Add an additional iron supplement every other day, ensuring it does not cause constipation.  - Consider taking vitamin C to enhance iron absorption.    Class 3 obesity  Significant weight loss achieved since December, with a reduction from 360 pounds to 280 pounds. Current weight management strategies include a low-protein diet and increased physical activity.  - Continue current dietary and lifestyle modifications to promote further weight loss.  - Monitor weight and adjust strategies as needed.    Allergic rhinitis  Allergic rhinitis. Current treatment includes azelastine nasal spray, but additional options are being considered due to side effects of oral antihistamines.  - Continue azelastine nasal spray.  - Consider using Flonase as a first-line treatment.  - Try Allegra as an alternative oral antihistamine if needed.    Mild obstructive sleep apnea  Mild obstructive sleep apnea identified on a home study. Current management focuses on lifestyle modifications rather than CPAP due to mild severity.  - Continue weight loss and  lifestyle modifications to improve sleep apnea.  - Consider positional therapy, such as sleeping on the side, to alleviate symptoms.    Followup 6 months     Ana Araujo DO       This medical note was created with the assistance of artificial intelligence (AI) for documentation purposes. The content has been reviewed and confirmed by the healthcare provider for accuracy and completeness. Patient consented to the use of audio recording and use of AI during their visit.

## 2025-08-14 ENCOUNTER — APPOINTMENT (OUTPATIENT)
Dept: PRIMARY CARE | Facility: CLINIC | Age: 35
End: 2025-08-14
Payer: COMMERCIAL

## 2025-09-23 ENCOUNTER — APPOINTMENT (OUTPATIENT)
Dept: ENDOCRINOLOGY | Facility: CLINIC | Age: 35
End: 2025-09-23
Payer: COMMERCIAL

## 2025-10-01 ENCOUNTER — APPOINTMENT (OUTPATIENT)
Dept: PRIMARY CARE | Facility: CLINIC | Age: 35
End: 2025-10-01
Payer: COMMERCIAL

## 2025-10-09 ENCOUNTER — APPOINTMENT (OUTPATIENT)
Dept: OBSTETRICS AND GYNECOLOGY | Facility: CLINIC | Age: 35
End: 2025-10-09
Payer: COMMERCIAL

## 2025-10-20 ENCOUNTER — APPOINTMENT (OUTPATIENT)
Dept: OBSTETRICS AND GYNECOLOGY | Facility: CLINIC | Age: 35
End: 2025-10-20
Payer: COMMERCIAL

## 2026-01-20 ENCOUNTER — APPOINTMENT (OUTPATIENT)
Dept: ENDOCRINOLOGY | Facility: CLINIC | Age: 36
End: 2026-01-20
Payer: COMMERCIAL

## 2026-02-04 ENCOUNTER — APPOINTMENT (OUTPATIENT)
Dept: PRIMARY CARE | Facility: CLINIC | Age: 36
End: 2026-02-04
Payer: COMMERCIAL